# Patient Record
Sex: MALE | Race: WHITE | NOT HISPANIC OR LATINO | URBAN - METROPOLITAN AREA
[De-identification: names, ages, dates, MRNs, and addresses within clinical notes are randomized per-mention and may not be internally consistent; named-entity substitution may affect disease eponyms.]

---

## 2017-03-18 ENCOUNTER — EMERGENCY (EMERGENCY)
Facility: HOSPITAL | Age: 52
LOS: 1 days | Discharge: HOME | End: 2017-03-18
Attending: EMERGENCY MEDICINE | Admitting: EMERGENCY MEDICINE
Payer: COMMERCIAL

## 2017-03-18 VITALS
SYSTOLIC BLOOD PRESSURE: 151 MMHG | HEART RATE: 62 BPM | HEIGHT: 67 IN | OXYGEN SATURATION: 98 % | TEMPERATURE: 98 F | RESPIRATION RATE: 17 BRPM | WEIGHT: 205.03 LBS | DIASTOLIC BLOOD PRESSURE: 98 MMHG

## 2017-03-18 DIAGNOSIS — M25.532 PAIN IN LEFT WRIST: ICD-10-CM

## 2017-03-18 DIAGNOSIS — L03.114 CELLULITIS OF LEFT UPPER LIMB: ICD-10-CM

## 2017-03-18 DIAGNOSIS — Z98.890 OTHER SPECIFIED POSTPROCEDURAL STATES: ICD-10-CM

## 2017-03-18 DIAGNOSIS — Z90.79 ACQUIRED ABSENCE OF OTHER GENITAL ORGAN(S): Chronic | ICD-10-CM

## 2017-03-18 PROCEDURE — 99284 EMERGENCY DEPT VISIT MOD MDM: CPT | Mod: 25

## 2017-03-18 NOTE — ED ADULT NURSE NOTE - OBJECTIVE STATEMENT
52y male pt arrived to ED c/o left wrist and hand pain with swelling. Pt states that he slept on his wrist/hand, woke up with symptoms this morning. Denies bug bites, No numbness or tingling, +sensation, +strong peripheral pulse, tender to touch, took Aleve in the morning with some relief.

## 2017-03-19 VITALS
HEART RATE: 69 BPM | OXYGEN SATURATION: 96 % | DIASTOLIC BLOOD PRESSURE: 90 MMHG | TEMPERATURE: 98 F | RESPIRATION RATE: 18 BRPM | SYSTOLIC BLOOD PRESSURE: 144 MMHG

## 2017-03-19 LAB
ALBUMIN SERPL ELPH-MCNC: 4.3 G/DL — SIGNIFICANT CHANGE UP (ref 3.3–5)
ALP SERPL-CCNC: 52 U/L — SIGNIFICANT CHANGE UP (ref 40–120)
ALT FLD-CCNC: 23 U/L RC — SIGNIFICANT CHANGE UP (ref 10–45)
ANION GAP SERPL CALC-SCNC: 14 MMOL/L — SIGNIFICANT CHANGE UP (ref 5–17)
AST SERPL-CCNC: 21 U/L — SIGNIFICANT CHANGE UP (ref 10–40)
BASE EXCESS BLDV CALC-SCNC: 2.5 MMOL/L — HIGH (ref -2–2)
BASOPHILS # BLD AUTO: 0 K/UL — SIGNIFICANT CHANGE UP (ref 0–0.2)
BASOPHILS NFR BLD AUTO: 0.3 % — SIGNIFICANT CHANGE UP (ref 0–2)
BILIRUB SERPL-MCNC: 0.7 MG/DL — SIGNIFICANT CHANGE UP (ref 0.2–1.2)
BUN SERPL-MCNC: 19 MG/DL — SIGNIFICANT CHANGE UP (ref 7–23)
CA-I SERPL-SCNC: 1.2 MMOL/L — SIGNIFICANT CHANGE UP (ref 1.12–1.3)
CALCIUM SERPL-MCNC: 9.3 MG/DL — SIGNIFICANT CHANGE UP (ref 8.4–10.5)
CHLORIDE BLDV-SCNC: 109 MMOL/L — HIGH (ref 96–108)
CHLORIDE SERPL-SCNC: 102 MMOL/L — SIGNIFICANT CHANGE UP (ref 96–108)
CO2 BLDV-SCNC: 30 MMOL/L — SIGNIFICANT CHANGE UP (ref 22–30)
CO2 SERPL-SCNC: 24 MMOL/L — SIGNIFICANT CHANGE UP (ref 22–31)
CREAT SERPL-MCNC: 1.13 MG/DL — SIGNIFICANT CHANGE UP (ref 0.5–1.3)
EOSINOPHIL # BLD AUTO: 0.4 K/UL — SIGNIFICANT CHANGE UP (ref 0–0.5)
EOSINOPHIL NFR BLD AUTO: 3 % — SIGNIFICANT CHANGE UP (ref 0–6)
ERYTHROCYTE [SEDIMENTATION RATE] IN BLOOD: 2 MM/HR — SIGNIFICANT CHANGE UP (ref 0–20)
GAS PNL BLDV: 134 MMOL/L — LOW (ref 136–145)
GAS PNL BLDV: SIGNIFICANT CHANGE UP
GAS PNL BLDV: SIGNIFICANT CHANGE UP
GLUCOSE BLDV-MCNC: 94 MG/DL — SIGNIFICANT CHANGE UP (ref 70–99)
GLUCOSE SERPL-MCNC: 102 MG/DL — HIGH (ref 70–99)
HCO3 BLDV-SCNC: 28 MMOL/L — SIGNIFICANT CHANGE UP (ref 21–29)
HCT VFR BLD CALC: 46.4 % — SIGNIFICANT CHANGE UP (ref 39–50)
HCT VFR BLDA CALC: 48 % — SIGNIFICANT CHANGE UP (ref 39–50)
HGB BLD CALC-MCNC: 15.8 G/DL — SIGNIFICANT CHANGE UP (ref 13–17)
HGB BLD-MCNC: 15.9 G/DL — SIGNIFICANT CHANGE UP (ref 13–17)
HOROWITZ INDEX BLDV+IHG-RTO: SIGNIFICANT CHANGE UP
LACTATE BLDV-MCNC: 0.9 MMOL/L — SIGNIFICANT CHANGE UP (ref 0.7–2)
LYMPHOCYTES # BLD AUTO: 16.4 % — SIGNIFICANT CHANGE UP (ref 13–44)
LYMPHOCYTES # BLD AUTO: 2 K/UL — SIGNIFICANT CHANGE UP (ref 1–3.3)
MCHC RBC-ENTMCNC: 31.7 PG — SIGNIFICANT CHANGE UP (ref 27–34)
MCHC RBC-ENTMCNC: 34.3 GM/DL — SIGNIFICANT CHANGE UP (ref 32–36)
MCV RBC AUTO: 92.6 FL — SIGNIFICANT CHANGE UP (ref 80–100)
MONOCYTES # BLD AUTO: 0.9 K/UL — SIGNIFICANT CHANGE UP (ref 0–0.9)
MONOCYTES NFR BLD AUTO: 7.5 % — SIGNIFICANT CHANGE UP (ref 2–14)
NEUTROPHILS # BLD AUTO: 8.7 K/UL — HIGH (ref 1.8–7.4)
NEUTROPHILS NFR BLD AUTO: 72.8 % — SIGNIFICANT CHANGE UP (ref 43–77)
OTHER CELLS CSF MANUAL: 12 ML/DL — LOW (ref 18–22)
PCO2 BLDV: 50 MMHG — SIGNIFICANT CHANGE UP (ref 35–50)
PH BLDV: 7.37 — SIGNIFICANT CHANGE UP (ref 7.35–7.45)
PLATELET # BLD AUTO: 305 K/UL — SIGNIFICANT CHANGE UP (ref 150–400)
PO2 BLDV: 32 MMHG — SIGNIFICANT CHANGE UP (ref 25–45)
POTASSIUM BLDV-SCNC: 4.1 MMOL/L — SIGNIFICANT CHANGE UP (ref 3.5–5)
POTASSIUM SERPL-MCNC: 4.3 MMOL/L — SIGNIFICANT CHANGE UP (ref 3.5–5.3)
POTASSIUM SERPL-SCNC: 4.3 MMOL/L — SIGNIFICANT CHANGE UP (ref 3.5–5.3)
PROT SERPL-MCNC: 7.1 G/DL — SIGNIFICANT CHANGE UP (ref 6–8.3)
RBC # BLD: 5.01 M/UL — SIGNIFICANT CHANGE UP (ref 4.2–5.8)
RBC # FLD: 11.2 % — SIGNIFICANT CHANGE UP (ref 10.3–14.5)
SAO2 % BLDV: 56 % — LOW (ref 67–88)
SODIUM SERPL-SCNC: 140 MMOL/L — SIGNIFICANT CHANGE UP (ref 135–145)
URATE SERPL-MCNC: 8 MG/DL — SIGNIFICANT CHANGE UP (ref 3.4–8.8)
WBC # BLD: 12 K/UL — HIGH (ref 3.8–10.5)
WBC # FLD AUTO: 12 K/UL — HIGH (ref 3.8–10.5)

## 2017-03-19 PROCEDURE — 80053 COMPREHEN METABOLIC PANEL: CPT

## 2017-03-19 PROCEDURE — 73130 X-RAY EXAM OF HAND: CPT | Mod: 26,LT

## 2017-03-19 PROCEDURE — 73110 X-RAY EXAM OF WRIST: CPT

## 2017-03-19 PROCEDURE — 84132 ASSAY OF SERUM POTASSIUM: CPT

## 2017-03-19 PROCEDURE — 83605 ASSAY OF LACTIC ACID: CPT

## 2017-03-19 PROCEDURE — 96375 TX/PRO/DX INJ NEW DRUG ADDON: CPT

## 2017-03-19 PROCEDURE — 73110 X-RAY EXAM OF WRIST: CPT | Mod: 26,LT

## 2017-03-19 PROCEDURE — 85014 HEMATOCRIT: CPT

## 2017-03-19 PROCEDURE — 82947 ASSAY GLUCOSE BLOOD QUANT: CPT

## 2017-03-19 PROCEDURE — 85652 RBC SED RATE AUTOMATED: CPT

## 2017-03-19 PROCEDURE — 85027 COMPLETE CBC AUTOMATED: CPT

## 2017-03-19 PROCEDURE — 73130 X-RAY EXAM OF HAND: CPT

## 2017-03-19 PROCEDURE — 82803 BLOOD GASES ANY COMBINATION: CPT

## 2017-03-19 PROCEDURE — 99284 EMERGENCY DEPT VISIT MOD MDM: CPT | Mod: 25

## 2017-03-19 PROCEDURE — 84550 ASSAY OF BLOOD/URIC ACID: CPT

## 2017-03-19 PROCEDURE — 96374 THER/PROPH/DIAG INJ IV PUSH: CPT

## 2017-03-19 PROCEDURE — 99236 HOSP IP/OBS SAME DATE HI 85: CPT

## 2017-03-19 PROCEDURE — 82435 ASSAY OF BLOOD CHLORIDE: CPT

## 2017-03-19 PROCEDURE — 82330 ASSAY OF CALCIUM: CPT

## 2017-03-19 PROCEDURE — G0378: CPT

## 2017-03-19 PROCEDURE — 84295 ASSAY OF SERUM SODIUM: CPT

## 2017-03-19 RX ORDER — SODIUM CHLORIDE 9 MG/ML
3 INJECTION INTRAMUSCULAR; INTRAVENOUS; SUBCUTANEOUS EVERY 8 HOURS
Qty: 0 | Refills: 0 | Status: DISCONTINUED | OUTPATIENT
Start: 2017-03-19 | End: 2017-03-22

## 2017-03-19 RX ORDER — SODIUM CHLORIDE 9 MG/ML
1000 INJECTION INTRAMUSCULAR; INTRAVENOUS; SUBCUTANEOUS ONCE
Qty: 0 | Refills: 0 | Status: COMPLETED | OUTPATIENT
Start: 2017-03-19 | End: 2017-03-19

## 2017-03-19 RX ORDER — IBUPROFEN 200 MG
1 TABLET ORAL
Qty: 40 | Refills: 0
Start: 2017-03-19 | End: 2017-03-29

## 2017-03-19 RX ORDER — KETOROLAC TROMETHAMINE 30 MG/ML
30 SYRINGE (ML) INJECTION ONCE
Qty: 0 | Refills: 0 | Status: DISCONTINUED | OUTPATIENT
Start: 2017-03-19 | End: 2017-03-19

## 2017-03-19 RX ORDER — IBUPROFEN 200 MG
600 TABLET ORAL ONCE
Qty: 0 | Refills: 0 | Status: COMPLETED | OUTPATIENT
Start: 2017-03-19 | End: 2017-03-19

## 2017-03-19 RX ADMIN — Medication 600 MILLIGRAM(S): at 00:35

## 2017-03-19 RX ADMIN — Medication 600 MILLIGRAM(S): at 02:13

## 2017-03-19 RX ADMIN — Medication 100 MILLIGRAM(S): at 09:47

## 2017-03-19 RX ADMIN — SODIUM CHLORIDE 3 MILLILITER(S): 9 INJECTION INTRAMUSCULAR; INTRAVENOUS; SUBCUTANEOUS at 05:05

## 2017-03-19 RX ADMIN — SODIUM CHLORIDE 1000 MILLILITER(S): 9 INJECTION INTRAMUSCULAR; INTRAVENOUS; SUBCUTANEOUS at 02:12

## 2017-03-19 RX ADMIN — Medication 100 MILLIGRAM(S): at 02:12

## 2017-03-19 RX ADMIN — Medication 30 MILLIGRAM(S): at 09:26

## 2017-03-19 NOTE — ED PROVIDER NOTE - OBJECTIVE STATEMENT
51 yo M with left wrist pain and swelling that he woke up with this morning without a hx of trauma. Pt says he thinks he slept on it. No Hx of bug bites. Denies hx of gout. Took alleve this morning with some relief. 51 yo M with left wrist pain and swelling that he woke up with this morning without a hx of trauma. Pt says he thinks he slept on it. No Hx of bug bites. Denies hx of gout. Took alleve this morning with some relief. No paresthesias, no weakness.

## 2017-03-19 NOTE — ED CDU PROVIDER NOTE - OBJECTIVE STATEMENT
51 yo M with left wrist pain and swelling that he woke up with this morning without a hx of trauma. Pt says he thinks he slept on it. No Hx of bug bites. Denies hx of gout. Took alleve this morning with some relief. No paresthesias, no weakness.

## 2017-03-19 NOTE — ED CDU PROVIDER NOTE - MEDICAL DECISION MAKING DETAILS
Shayla: 51 yo M with left wrist pain, swelling, redness and warmth that he woke up with this morning without a hx of trauma.  -motrin, xrays of L hand and wrist, reassess  -will give clindamycin, perform labs and observe in the CDU for LUE cellulitis

## 2017-03-19 NOTE — ED CDU PROVIDER NOTE - ATTENDING CONTRIBUTION TO CARE
I performed a history and physical exam of the patient and discussed their management with the advanced care provider. I reviewed the advanced care provider's note and agree with the documented findings and plan of care. My medical decision making and objective findings are found above.

## 2017-03-19 NOTE — ED ADULT NURSE REASSESSMENT NOTE - NS ED NURSE REASSESS COMMENT FT1
Pt resting comfortably on stretcher, VSS, no complaints.
pt resting in stretcher.  VSS.  Pt reports 5 out of 10 left hand pain.  JOSE ELIAS Bailey made aware.  pts right hand visibly swollen and red.  pt states that the redness has improved since yesterday.  Next dose of clinda due at 10am.  plan of care explained. Safety maintained. Will cont to monitor.
Received pt from [facility] , received pt alert and responsive, oriented x4. Pt transferred to CDU for observation for L hand cellulitis, + redness, swelling to L hand, pt to receive IV antibiotics as ordered next dose due at 10am. IV in place, patent and free of signs of infiltration, V/S stable, pt afebrile, pt denies pain at this time. Pt educated on unit and unit rules, instructed patient to notify RN of any needed assistance, Pt verbalizes understanding, Call bell placed within reach. Spouse at bedside. Safety maintained. Will continue to monitor.

## 2017-03-19 NOTE — ED PROVIDER NOTE - MEDICAL DECISION MAKING DETAILS
51 yo M with left wrist pain and swelling that he woke up with this morning without a hx of trauma.  -motrin, xrays of L hand and wrist, reassess 53 yo M with left wrist pain and swelling that he woke up with this morning without a hx of trauma.  -motrin, xrays of L hand and wrist, reassess  -will give clindamycin, perform labs and observe in the CDU for LUE cellulitis 51 yo M with left wrist pain, swelling, redness and warmth that he woke up with this morning without a hx of trauma.  -motrin, xrays of L hand and wrist, reassess  -will give clindamycin, perform labs and observe in the CDU for LUE cellulitis

## 2017-03-19 NOTE — ED CDU PROVIDER NOTE - PROGRESS NOTE DETAILS
CDU NOTE JOSE ELIAS DELEON: Pt resting comfortably, feeling well without complaint. NAD, VSS. Pt sleeping comfortably, NAD.  VSS.  BS ATTENDING MD Britta: Pt with L-hand redness and swellign felt to be cellulitis obsed overnight. Erythema and swellign and pain all improved per patient. Exam with mild erythema and edema of dorsum of L-hand and wrist with mild TTP. Mild pain moving thumb and wrist. No palpable effusions. Given clear improvement, likely a correct diagnosis. Pt placed in pre-lucero splint for comfort. No signs of systemic infection. If continued improvement on IV antibiotics pt is likely stable to be switched to PO antibiotics and f/u as outpatient. If symptoms improve pt will f/u with PCP. If they do not improve or worsen, he will f/u with hand.

## 2017-03-19 NOTE — ED CDU PROVIDER NOTE - DETAILS
53 y/o M p/w cellulitis on hand   - observation status and frequent re-evaluation  - IV antibiotics  - pain control prn  - plan d/w Dr. Mcguire

## 2017-03-19 NOTE — ED PROVIDER NOTE - NEUROLOGICAL, MLM
Alert and oriented, no focal deficits, +ttp and mild swelling of the distal radius and thenar eminence. Alert and oriented, no focal deficits, +ttp and mild swelling of the distal radius and thenar eminence with erythema, warmth

## 2017-03-19 NOTE — ED CDU PROVIDER NOTE - PLAN OF CARE
(1) You will need to follow-up with your PMD in 2-3 days for your cellulitis. A copy of your results were given with you to bring to your appt.  (2) Be sure to review attached discharge paperwork.  (3) Drink plenty of fluids to stay hydrated.  (4) Continue your home medications as directed ALONG WITH Clindamycin 450mg three times per day for 7 days.  (5) Use Tylenol (extra strength over-the-counter) or Motrin (600mg which is three 200mg over-the-counter tablets at once every 6hrs) for your fevers or pain.  (6) Use warm compresses to the affected area for 20min at a time several times/day for next few days. Be careful not to burn your skin.  (7) Return to ER for uncontrolled fever, severe pain, trouble keeping down fluids, spread of infection or any other concerns.

## 2017-08-22 ENCOUNTER — APPOINTMENT (OUTPATIENT)
Dept: PULMONOLOGY | Facility: CLINIC | Age: 52
End: 2017-08-22
Payer: COMMERCIAL

## 2017-08-22 VITALS
OXYGEN SATURATION: 97 % | HEART RATE: 93 BPM | BODY MASS INDEX: 32.58 KG/M2 | WEIGHT: 220 LBS | HEIGHT: 69 IN | DIASTOLIC BLOOD PRESSURE: 80 MMHG | SYSTOLIC BLOOD PRESSURE: 118 MMHG

## 2017-08-22 PROCEDURE — 99214 OFFICE O/P EST MOD 30 MIN: CPT

## 2017-08-22 RX ORDER — IBUPROFEN 600 MG/1
600 TABLET, FILM COATED ORAL
Qty: 40 | Refills: 0 | Status: DISCONTINUED | COMMUNITY
Start: 2017-03-19

## 2017-08-22 RX ORDER — CLINDAMYCIN HYDROCHLORIDE 150 MG/1
150 CAPSULE ORAL
Qty: 90 | Refills: 0 | Status: DISCONTINUED | COMMUNITY
Start: 2017-03-19

## 2017-09-27 ENCOUNTER — APPOINTMENT (OUTPATIENT)
Dept: PULMONOLOGY | Facility: CLINIC | Age: 52
End: 2017-09-27
Payer: COMMERCIAL

## 2017-09-27 PROCEDURE — G0008: CPT

## 2017-09-27 PROCEDURE — 90686 IIV4 VACC NO PRSV 0.5 ML IM: CPT

## 2018-02-20 ENCOUNTER — APPOINTMENT (OUTPATIENT)
Dept: PULMONOLOGY | Facility: CLINIC | Age: 53
End: 2018-02-20

## 2019-04-01 PROBLEM — J44.9 CHRONIC OBSTRUCTIVE PULMONARY DISEASE, UNSPECIFIED: Chronic | Status: ACTIVE | Noted: 2017-03-18

## 2019-04-01 PROBLEM — C61 MALIGNANT NEOPLASM OF PROSTATE: Chronic | Status: ACTIVE | Noted: 2017-03-18

## 2019-04-02 ENCOUNTER — APPOINTMENT (OUTPATIENT)
Dept: PULMONOLOGY | Facility: CLINIC | Age: 54
End: 2019-04-02
Payer: COMMERCIAL

## 2019-04-02 VITALS
HEART RATE: 92 BPM | SYSTOLIC BLOOD PRESSURE: 124 MMHG | DIASTOLIC BLOOD PRESSURE: 80 MMHG | WEIGHT: 229 LBS | HEIGHT: 69 IN | OXYGEN SATURATION: 94 % | BODY MASS INDEX: 33.92 KG/M2

## 2019-04-02 PROCEDURE — 94060 EVALUATION OF WHEEZING: CPT

## 2019-04-02 PROCEDURE — 94729 DIFFUSING CAPACITY: CPT

## 2019-04-02 PROCEDURE — 94727 GAS DIL/WSHOT DETER LNG VOL: CPT

## 2019-04-02 PROCEDURE — 99214 OFFICE O/P EST MOD 30 MIN: CPT | Mod: 25

## 2019-04-08 NOTE — DISCUSSION/SUMMARY
[FreeTextEntry1] : 53 yo male with stable OAD. He is to continue anoro daily with PRN albuterol MDI. Treatment adjustment will depend on symptomatic needs.

## 2019-04-08 NOTE — REVIEW OF SYSTEMS
[Cough] : no cough [Sputum] : not coughing up ~M sputum [Dyspnea] : no dyspnea [Negative] : Sleep Disorder

## 2019-04-08 NOTE — HISTORY OF PRESENT ILLNESS
[FreeTextEntry1] : 55 yo male with hx of COPD presents for follow up. The patient feels "good" on daily anoro with PRN albuterol MDI. He denies cough, chest pain, SOB or hemoptysis.

## 2019-07-03 ENCOUNTER — OTHER (OUTPATIENT)
Age: 54
End: 2019-07-03

## 2019-10-01 ENCOUNTER — APPOINTMENT (OUTPATIENT)
Dept: PULMONOLOGY | Facility: CLINIC | Age: 54
End: 2019-10-01

## 2019-11-19 ENCOUNTER — APPOINTMENT (OUTPATIENT)
Dept: PULMONOLOGY | Facility: CLINIC | Age: 54
End: 2019-11-19
Payer: COMMERCIAL

## 2019-11-19 VITALS
WEIGHT: 234 LBS | HEART RATE: 100 BPM | BODY MASS INDEX: 34.56 KG/M2 | TEMPERATURE: 99.5 F | OXYGEN SATURATION: 95 % | DIASTOLIC BLOOD PRESSURE: 80 MMHG | SYSTOLIC BLOOD PRESSURE: 124 MMHG

## 2019-11-19 PROCEDURE — 99214 OFFICE O/P EST MOD 30 MIN: CPT

## 2019-11-25 NOTE — DISCUSSION/SUMMARY
[FreeTextEntry1] : 53 yo male with OAD  with acute bronchitis. Medrol and zpack prescribed. He is to continue use of anoro daily with PRN albuterol MDI. Treatment adjustment will depend on symptomatic needs. He is to follow up with his PMD for the influenza vaccine.

## 2019-11-25 NOTE — REVIEW OF SYSTEMS
[Nasal Congestion] : nasal congestion [As Noted in HPI] : as noted in HPI [Sputum] : sputum  [Cough] : cough [Dyspnea] : dyspnea [Wheezing] : wheezing [Negative] : Sleep Disorder

## 2019-11-25 NOTE — PHYSICAL EXAM
[General Appearance - Well Developed] : well developed [Normal Appearance] : normal appearance [General Appearance - Well Nourished] : well nourished [No Deformities] : no deformities [Well Groomed] : well groomed [General Appearance - In No Acute Distress] : no acute distress [Normal Oropharynx] : normal oropharynx [Eyelids - No Xanthelasma] : the eyelids demonstrated no xanthelasmas [Normal Conjunctiva] : the conjunctiva exhibited no abnormalities [Neck Cervical Mass (___cm)] : no neck mass was observed [Neck Appearance] : the appearance of the neck was normal [Jugular Venous Distention Increased] : there was no jugular-venous distention [Thyroid Diffuse Enlargement] : the thyroid was not enlarged [Thyroid Nodule] : there were no palpable thyroid nodules [Heart Sounds] : normal S1 and S2 [Heart Rate And Rhythm] : heart rate and rhythm were normal [Exaggerated Use Of Accessory Muscles For Inspiration] : no accessory muscle use [Murmurs] : no murmurs present [Respiration, Rhythm And Depth] : normal respiratory rhythm and effort [Decreased Breath Sounds] : decreased breath sounds [Scattered Wheezes] : scattered wheezing [Abdomen Tenderness] : non-tender [Abdomen Soft] : soft [Abdomen Mass (___ Cm)] : no abdominal mass palpated [Nail Clubbing] : no clubbing of the fingernails [Petechial Hemorrhages (___cm)] : no petechial hemorrhages [Cyanosis, Localized] : no localized cyanosis [Skin Color & Pigmentation] : normal skin color and pigmentation [] : no rash [No Venous Stasis] : no venous stasis [No Skin Ulcers] : no skin ulcer [Skin Lesions] : no skin lesions [No Xanthoma] : no  xanthoma was observed [No Focal Deficits] : no focal deficits [Impaired Insight] : insight and judgment were intact [Oriented To Time, Place, And Person] : oriented to person, place, and time [Affect] : the affect was normal

## 2019-11-25 NOTE — CONSULT LETTER
[Dear  ___] : Dear  [unfilled], [Courtesy Letter:] : I had the pleasure of seeing your patient, [unfilled], in my office today. [Sincerely,] : Sincerely, [Consult Closing:] : Thank you very much for allowing me to participate in the care of this patient.  If you have any questions, please do not hesitate to contact me. [Please see my note below.] : Please see my note below.

## 2019-11-25 NOTE — HISTORY OF PRESENT ILLNESS
[FreeTextEntry1] : 55 yo male with hx of OAD presents for follow up. The patient complains of a 2 week history of productive cough with SOB . He has increased use of albuterol MDI and continues to use anoro daily. He was treated with claritin and expectorant by his PMD with some improvement. He denies fever, chest pain or hemoptysis.

## 2021-02-23 ENCOUNTER — APPOINTMENT (OUTPATIENT)
Dept: PULMONOLOGY | Facility: CLINIC | Age: 56
End: 2021-02-23
Payer: COMMERCIAL

## 2021-02-23 VITALS
TEMPERATURE: 98 F | HEART RATE: 85 BPM | DIASTOLIC BLOOD PRESSURE: 98 MMHG | RESPIRATION RATE: 16 BRPM | WEIGHT: 224 LBS | SYSTOLIC BLOOD PRESSURE: 141 MMHG | OXYGEN SATURATION: 94 % | BODY MASS INDEX: 33.08 KG/M2

## 2021-02-23 DIAGNOSIS — I10 ESSENTIAL (PRIMARY) HYPERTENSION: ICD-10-CM

## 2021-02-23 PROCEDURE — 99213 OFFICE O/P EST LOW 20 MIN: CPT

## 2021-02-23 PROCEDURE — 99072 ADDL SUPL MATRL&STAF TM PHE: CPT

## 2021-03-07 PROBLEM — I10 HTN (HYPERTENSION): Status: ACTIVE | Noted: 2021-03-07

## 2021-03-07 NOTE — DISCUSSION/SUMMARY
[FreeTextEntry1] : 55 yo male with OAD at baseline. He is to continue anoro as before with PRN albuterol MDI. Treatment adjustment will depend on symptomatic needs. Follow up with for joint pains and his PMD for BP control.

## 2021-03-07 NOTE — HISTORY OF PRESENT ILLNESS
[TextBox_4] : 57 yo male with hx of OAD presents for follow up. The patient is "OK" on daily anoro with PRN albuterol MDI use. He complains of PRN productive cough. He was recently treated for "gout" complaining of joint pain,presently being evaluated by rheumatology. He is being treated with allopurinol with some improvement.

## 2021-08-24 ENCOUNTER — APPOINTMENT (OUTPATIENT)
Dept: PULMONOLOGY | Facility: CLINIC | Age: 56
End: 2021-08-24

## 2021-12-29 ENCOUNTER — APPOINTMENT (OUTPATIENT)
Dept: PULMONOLOGY | Facility: CLINIC | Age: 56
End: 2021-12-29
Payer: COMMERCIAL

## 2021-12-29 VITALS
HEIGHT: 69 IN | TEMPERATURE: 98.7 F | HEART RATE: 92 BPM | WEIGHT: 212 LBS | OXYGEN SATURATION: 94 % | BODY MASS INDEX: 31.4 KG/M2 | DIASTOLIC BLOOD PRESSURE: 92 MMHG | SYSTOLIC BLOOD PRESSURE: 144 MMHG

## 2021-12-29 DIAGNOSIS — J20.9 ACUTE BRONCHITIS, UNSPECIFIED: ICD-10-CM

## 2021-12-29 PROCEDURE — 99213 OFFICE O/P EST LOW 20 MIN: CPT

## 2022-01-16 PROBLEM — J20.9 ACUTE BRONCHITIS WITH OBSTRUCTION: Status: RESOLVED | Noted: 2022-01-16 | Resolved: 2022-02-15

## 2022-01-16 NOTE — HISTORY OF PRESENT ILLNESS
[Former] : former [< 30 pack-years] : < 30 pack-years [TextBox_4] : 57 yo male with hx of OAD presents complaining of two week history of increased SOB with PRN cough and wheezing. He denies fever, chest pain or hemoptysis. He uses anoro daily with recent increase in albuterol MDI. He is covid 19 vaccinated and had booster after which he claims his symptoms have increased. [TextBox_29] : Denies snoring, daytime somnolence, apneic episodes, AM headaches

## 2022-01-16 NOTE — DISCUSSION/SUMMARY
[FreeTextEntry1] : 55 yo male with OAD exacerbation. Prednisone 40 mg daily for five days prescribed. He was given a one week sample of breztri after which he is to resume anoro daily.Continued PRN albuterol MDI use.

## 2022-01-16 NOTE — REVIEW OF SYSTEMS
[Cough] : cough [Dyspnea] : dyspnea [Wheezing] : wheezing [Arthralgias] : arthralgias [Negative] : Endocrine

## 2022-01-31 RX ORDER — UMECLIDINIUM BROMIDE AND VILANTEROL TRIFENATATE 62.5; 25 UG/1; UG/1
62.5-25 POWDER RESPIRATORY (INHALATION) DAILY
Qty: 1 | Refills: 3 | Status: ACTIVE | COMMUNITY
Start: 2022-01-31 | End: 1900-01-01

## 2022-11-15 ENCOUNTER — APPOINTMENT (OUTPATIENT)
Dept: PULMONOLOGY | Facility: CLINIC | Age: 57
End: 2022-11-15

## 2022-11-15 VITALS
DIASTOLIC BLOOD PRESSURE: 82 MMHG | OXYGEN SATURATION: 95 % | RESPIRATION RATE: 20 BRPM | SYSTOLIC BLOOD PRESSURE: 126 MMHG | HEART RATE: 92 BPM | TEMPERATURE: 97 F

## 2022-11-15 DIAGNOSIS — J21.9 ACUTE BRONCHIOLITIS, UNSPECIFIED: ICD-10-CM

## 2022-11-15 PROCEDURE — 94727 GAS DIL/WSHOT DETER LNG VOL: CPT

## 2022-11-15 PROCEDURE — 94060 EVALUATION OF WHEEZING: CPT

## 2022-11-15 PROCEDURE — 94729 DIFFUSING CAPACITY: CPT

## 2022-11-15 PROCEDURE — 99214 OFFICE O/P EST MOD 30 MIN: CPT | Mod: 25

## 2022-11-15 NOTE — HISTORY OF PRESENT ILLNESS
[Former] : former [TextBox_4] : 56 yo male with hx of OAD presents for follow up. The patient complains of few week history of increasing MURRAY with mild productive cough. He denies fever, chest pain or hemoptysis. He uses anoro daily with PRN xopenex.  [TextBox_11] : 1 1/2 [TextBox_13] : 30 [YearQuit] : 2010 [TextBox_29] : Denies snoring, daytime somnolence, apneic episodes, AM headaches

## 2022-11-15 NOTE — PHYSICAL EXAM
[No Acute Distress] : no acute distress [Normal Oropharynx] : normal oropharynx [Normal Appearance] : normal appearance [No Neck Mass] : no neck mass [Normal Rate/Rhythm] : normal rate/rhythm [Normal S1, S2] : normal s1, s2 [No Murmurs] : no murmurs [No Resp Distress] : no resp distress [Clear to Auscultation Bilaterally] : clear to auscultation bilaterally [No Abnormalities] : no abnormalities [Benign] : benign [Normal Gait] : normal gait [No Clubbing] : no clubbing [No Cyanosis] : no cyanosis [No Edema] : no edema [FROM] : FROM [Normal Color/ Pigmentation] : normal color/ pigmentation [No Focal Deficits] : no focal deficits [Oriented x3] : oriented x3 [Normal Affect] : normal affect [TextBox_68] : Decreased breath sounds bilaterally

## 2022-11-15 NOTE — REVIEW OF SYSTEMS
[Cough] : cough [Sputum] : sputum [Wheezing] : wheezing [SOB on Exertion] : sob on exertion [Arthralgias] : arthralgias [Negative] : Endocrine

## 2023-04-12 ENCOUNTER — APPOINTMENT (OUTPATIENT)
Dept: OPHTHALMOLOGY | Facility: CLINIC | Age: 58
End: 2023-04-12

## 2023-05-05 NOTE — DISCUSSION/SUMMARY
[FreeTextEntry1] : 58 yo male with hx of OAD with recent exacerbation. Prednisone 40 mg daily for five days prescribed. He was given a one month sample of trelegy 100 to use in place of anoro. Treatment adjustment will depend on symptomatic needs.I reviewed the PFT results. A low dose screening chest CT will be performed.
bilateral UE Passive ROM was WFL  (within functional limits)

## 2023-05-09 ENCOUNTER — APPOINTMENT (OUTPATIENT)
Dept: PULMONOLOGY | Facility: CLINIC | Age: 58
End: 2023-05-09
Payer: COMMERCIAL

## 2023-05-09 VITALS
OXYGEN SATURATION: 96 % | TEMPERATURE: 98.4 F | WEIGHT: 219 LBS | SYSTOLIC BLOOD PRESSURE: 116 MMHG | HEART RATE: 86 BPM | BODY MASS INDEX: 32.34 KG/M2 | DIASTOLIC BLOOD PRESSURE: 80 MMHG

## 2023-05-09 DIAGNOSIS — R05.9 COUGH, UNSPECIFIED: ICD-10-CM

## 2023-05-09 DIAGNOSIS — J44.9 CHRONIC OBSTRUCTIVE PULMONARY DISEASE, UNSPECIFIED: ICD-10-CM

## 2023-05-09 DIAGNOSIS — R06.00 DYSPNEA, UNSPECIFIED: ICD-10-CM

## 2023-05-09 PROCEDURE — 99214 OFFICE O/P EST MOD 30 MIN: CPT

## 2023-06-29 RX ORDER — FLUTICASONE FUROATE, UMECLIDINIUM BROMIDE AND VILANTEROL TRIFENATATE 100; 62.5; 25 UG/1; UG/1; UG/1
100-62.5-25 POWDER RESPIRATORY (INHALATION)
Qty: 1 | Refills: 3 | Status: ACTIVE | COMMUNITY
Start: 2023-06-29 | End: 1900-01-01

## 2023-07-14 ENCOUNTER — APPOINTMENT (OUTPATIENT)
Dept: PULMONOLOGY | Facility: CLINIC | Age: 58
End: 2023-07-14
Payer: COMMERCIAL

## 2023-07-14 VITALS
SYSTOLIC BLOOD PRESSURE: 120 MMHG | HEART RATE: 104 BPM | OXYGEN SATURATION: 93 % | TEMPERATURE: 98.9 F | DIASTOLIC BLOOD PRESSURE: 80 MMHG | BODY MASS INDEX: 32.49 KG/M2 | WEIGHT: 220 LBS

## 2023-07-14 PROCEDURE — 99214 OFFICE O/P EST MOD 30 MIN: CPT

## 2023-07-14 RX ORDER — MONTELUKAST 10 MG/1
10 TABLET, FILM COATED ORAL
Qty: 90 | Refills: 1 | Status: ACTIVE | COMMUNITY
Start: 2023-07-14 | End: 1900-01-01

## 2023-07-25 NOTE — HISTORY OF PRESENT ILLNESS
[Former] : former [>= 20 pack years] : >= 20 pack years [TextBox_4] : 58-year-old male with history of OAD presents for follow-up.  The patient complains of few week history of productive cough with nasal congestion associated with shortness of breath.  He has been using Trelegy for the last 2 weeks in place of Anoro and has increased use of L-albuterol.  He denies fever chills chest pain or hemoptysis. [TextBox_11] : 1 1/2 [TextBox_13] : 30 [YearQuit] : 2010 [TextBox_29] : Denies snoring, daytime somnolence, apneic episodes, AM headaches

## 2023-07-25 NOTE — PHYSICAL EXAM
[No Acute Distress] : no acute distress [Normal Oropharynx] : normal oropharynx [Normal Appearance] : normal appearance [No Neck Mass] : no neck mass [Normal Rate/Rhythm] : normal rate/rhythm [No Murmurs] : no murmurs [Normal S1, S2] : normal s1, s2 [No Resp Distress] : no resp distress [Clear to Auscultation Bilaterally] : clear to auscultation bilaterally [No Abnormalities] : no abnormalities [Benign] : benign [Normal Gait] : normal gait [No Clubbing] : no clubbing [No Cyanosis] : no cyanosis [No Edema] : no edema [FROM] : FROM [Normal Color/ Pigmentation] : normal color/ pigmentation [No Focal Deficits] : no focal deficits [Oriented x3] : oriented x3 [Normal Affect] : normal affect [TextBox_68] : Decreased breath sounds bilaterally

## 2023-07-25 NOTE — DISCUSSION/SUMMARY
[FreeTextEntry1] : 58-year-old male with history of OAD and related complaints.  He was given a 2-week sample of Trelegy 100.  He is to continue Anoro daily as well as albuterol as needed.  A follow-up chest CT will be performed.

## 2023-07-25 NOTE — REVIEW OF SYSTEMS
[Nasal Congestion] : nasal congestion [Cough] : cough [Sputum] : sputum [Wheezing] : wheezing [SOB on Exertion] : sob on exertion [Arthralgias] : arthralgias [Negative] : Endocrine

## 2023-07-26 NOTE — HISTORY OF PRESENT ILLNESS
[Former] : former [>= 20 pack years] : >= 20 pack years [TextBox_4] : 58-year-old male with history of OAD presents for follow-up.  Patient continues to complain of productive cough with nasal congestion.  He denies fever, chest pain, or hemoptysis. [TextBox_11] : 1 1/2 [TextBox_13] : 30 [YearQuit] : 2010 [TextBox_29] : Denies snoring, daytime somnolence, apneic episodes, AM headaches

## 2023-07-26 NOTE — DISCUSSION/SUMMARY
[FreeTextEntry1] : 58-year-old male with history of OAD and related complaints.  He is to continue use of Anoro as before as well as albuterol.  Treatment just will depend on symptomatic needs.

## 2023-07-27 PROBLEM — Z78.9 NO SIGNIFICANT FAMILY HISTORY: Status: ACTIVE | Noted: 2023-07-27

## 2023-07-27 RX ORDER — PREDNISONE 20 MG/1
20 TABLET ORAL
Qty: 10 | Refills: 0 | Status: DISCONTINUED | COMMUNITY
Start: 2023-07-14 | End: 2023-07-27

## 2023-07-27 RX ORDER — PREDNISONE 20 MG/1
20 TABLET ORAL
Qty: 10 | Refills: 0 | Status: DISCONTINUED | COMMUNITY
Start: 2021-12-29 | End: 2023-07-27

## 2023-07-28 ENCOUNTER — APPOINTMENT (OUTPATIENT)
Dept: SURGERY | Facility: CLINIC | Age: 58
End: 2023-07-28
Payer: COMMERCIAL

## 2023-07-28 VITALS
HEIGHT: 68 IN | DIASTOLIC BLOOD PRESSURE: 79 MMHG | RESPIRATION RATE: 16 BRPM | SYSTOLIC BLOOD PRESSURE: 111 MMHG | BODY MASS INDEX: 32.58 KG/M2 | TEMPERATURE: 94.3 F | WEIGHT: 215 LBS | HEART RATE: 82 BPM | OXYGEN SATURATION: 96 %

## 2023-07-28 DIAGNOSIS — K42.9 UMBILICAL HERNIA W/OUT OBSTRUCTION OR GANGRENE: ICD-10-CM

## 2023-07-28 DIAGNOSIS — Z78.9 OTHER SPECIFIED HEALTH STATUS: ICD-10-CM

## 2023-07-28 PROCEDURE — 99203 OFFICE O/P NEW LOW 30 MIN: CPT

## 2023-07-28 RX ORDER — LEVALBUTEROL TARTRATE 45 UG/1
45 AEROSOL, METERED ORAL TWICE DAILY
Qty: 3 | Refills: 1 | Status: DISCONTINUED | COMMUNITY
Start: 2018-01-30 | End: 2023-07-28

## 2023-07-28 RX ORDER — UMECLIDINIUM BROMIDE AND VILANTEROL TRIFENATATE 62.5; 25 UG/1; UG/1
62.5-25 POWDER RESPIRATORY (INHALATION) DAILY
Qty: 3 | Refills: 3 | Status: DISCONTINUED | COMMUNITY
Start: 2019-01-14 | End: 2023-07-28

## 2023-07-28 RX ORDER — LEVALBUTEROL TARTRATE 45 UG/1
45 AEROSOL, METERED ORAL TWICE DAILY
Qty: 3 | Refills: 3 | Status: DISCONTINUED | COMMUNITY
Start: 2019-07-03 | End: 2023-07-28

## 2023-07-28 RX ORDER — AMLODIPINE BESYLATE 5 MG/1
5 TABLET ORAL
Refills: 0 | Status: ACTIVE | COMMUNITY

## 2023-07-28 RX ORDER — UMECLIDINIUM BROMIDE AND VILANTEROL TRIFENATATE 62.5; 25 UG/1; UG/1
62.5-25 POWDER RESPIRATORY (INHALATION) DAILY
Qty: 3 | Refills: 1 | Status: DISCONTINUED | COMMUNITY
Start: 2020-03-31 | End: 2023-07-28

## 2023-07-28 RX ORDER — ALLOPURINOL 100 MG/1
100 TABLET ORAL
Refills: 0 | Status: ACTIVE | COMMUNITY

## 2023-07-28 RX ORDER — LEVALBUTEROL TARTRATE 45 UG/1
AEROSOL, METERED ORAL
Refills: 0 | Status: ACTIVE | COMMUNITY

## 2023-07-28 RX ORDER — LEVALBUTEROL TARTRATE 45 UG/1
45 AEROSOL, METERED ORAL TWICE DAILY
Qty: 3 | Refills: 3 | Status: DISCONTINUED | COMMUNITY
Start: 2023-07-14 | End: 2023-07-28

## 2023-07-28 RX ORDER — AZITHROMYCIN 250 MG/1
250 TABLET, FILM COATED ORAL
Qty: 1 | Refills: 3 | Status: DISCONTINUED | COMMUNITY
Start: 2019-11-19 | End: 2023-07-28

## 2023-07-28 RX ORDER — METHYLPREDNISOLONE 4 MG/1
4 TABLET ORAL
Qty: 1 | Refills: 0 | Status: DISCONTINUED | COMMUNITY
Start: 2019-11-19 | End: 2023-07-28

## 2023-07-28 RX ORDER — AZITHROMYCIN 250 MG/1
250 TABLET, FILM COATED ORAL
Qty: 1 | Refills: 0 | Status: DISCONTINUED | COMMUNITY
Start: 2023-07-14 | End: 2023-07-28

## 2023-07-28 NOTE — ASSESSMENT
[FreeTextEntry1] : In summary the patient has a small asymptomatic nontender easily reducible fat-containing recurrent incisional hernia at his umbilicus.  This was initially repaired with mesh at the time of his robotic prostatectomy 12 years ago.  It has not appreciably increasing in size and since it is asymptomatic and completely reducible I recommended conservative treatment.  I will see him in 1 year for a checkup and instructed him to call me if he notices that it is increasing in size or bothering him in any way.  I also encouraged him to try to lose weight as any elective hernia repair will be at increased risk of recurrence due to his obesity.

## 2023-07-28 NOTE — PHYSICAL EXAM
[Normal Breath Sounds] : Normal breath sounds [Normal Heart Sounds] : normal heart sounds [No Rash or Lesion] : No rash or lesion [Alert] : alert [Oriented to Person] : oriented to person [Oriented to Place] : oriented to place [Oriented to Time] : oriented to time [Calm] : calm [de-identified] : WNL [de-identified] : WNL [de-identified] : VIVIANL [de-identified] : Small nontender easily reducible fat-containing recurrent incisional hernia at the umbilicus.  There is a left paramedian incision without evidence of incisional hernia.  No palpable inguinal hernias bilaterally [de-identified] : WNL ROM [de-identified] : WNL

## 2023-08-17 RX ORDER — FLUTICASONE FUROATE, UMECLIDINIUM BROMIDE AND VILANTEROL TRIFENATATE 100; 62.5; 25 UG/1; UG/1; UG/1
100-62.5-25 POWDER RESPIRATORY (INHALATION)
Qty: 3 | Refills: 3 | Status: ACTIVE | COMMUNITY
Start: 2023-08-17 | End: 1900-01-01

## 2023-08-22 NOTE — ED ADULT NURSE NOTE - FINAL NURSING ELECTRONIC SIGNATURE
Patient: Duke Freire    Procedure Summary       Date: 08/22/23 Room / Location: [facility] OR 11 / [facility] MAIN OR    Anesthesia Start: 0753 Anesthesia Stop: 1005    Procedure: TOTAL HIP ARTHROPLASTY ANTERIOR WITH HANA TABLE (Right: Hip) Diagnosis:       Primary osteoarthritis of right hip      (Primary osteoarthritis of right hip [M16.11])    Surgeons: Wilber Mccullough MD Provider: Johnny Best MD    Anesthesia Type: general ASA Status: 3            Anesthesia Type: general    Vitals  Vitals Value Taken Time   /79 08/22/23 1249   Temp 97.6 øF (36.4 øC) 08/22/23 1245   Pulse 84 08/22/23 1250   Resp 9 08/22/23 1245   SpO2 98 % 08/22/23 1250   Vitals shown include unvalidated device data.        Post Anesthesia Care and Evaluation    Patient location during evaluation: PACU  Patient participation: complete - patient participated  Level of consciousness: awake  Pain scale: See nurse's notes for pain score.  Pain management: adequate    Airway patency: patent  Anesthetic complications: No anesthetic complications  PONV Status: none  Cardiovascular status: acceptable  Respiratory status: acceptable and spontaneous ventilation  Hydration status: acceptable    Comments: Patient seen and examined postoperatively; vital signs stable; SpO2 greater than or equal to 90%; cardiopulmonary status stable; nausea/vomiting adequately controlled; pain adequately controlled; no apparent anesthesia complications; patient discharged from anesthesia care when discharge criteria were met     19-Mar-2017 11:22

## 2023-10-13 ENCOUNTER — APPOINTMENT (OUTPATIENT)
Dept: PULMONOLOGY | Facility: CLINIC | Age: 58
End: 2023-10-13
Payer: COMMERCIAL

## 2023-10-13 VITALS
OXYGEN SATURATION: 95 % | SYSTOLIC BLOOD PRESSURE: 120 MMHG | TEMPERATURE: 98 F | HEART RATE: 93 BPM | BODY MASS INDEX: 33.3 KG/M2 | DIASTOLIC BLOOD PRESSURE: 80 MMHG | WEIGHT: 219 LBS

## 2023-10-13 DIAGNOSIS — Z23 ENCOUNTER FOR IMMUNIZATION: ICD-10-CM

## 2023-10-13 PROCEDURE — 99214 OFFICE O/P EST MOD 30 MIN: CPT | Mod: 25

## 2023-10-13 PROCEDURE — 90686 IIV4 VACC NO PRSV 0.5 ML IM: CPT

## 2023-10-13 PROCEDURE — G0008: CPT

## 2023-10-14 PROBLEM — Z23 ENCOUNTER FOR IMMUNIZATION: Status: ACTIVE | Noted: 2023-10-14 | Resolved: 2023-10-28

## 2023-10-18 ENCOUNTER — MED ADMIN CHARGE (OUTPATIENT)
Age: 58
End: 2023-10-18

## 2023-11-07 ENCOUNTER — APPOINTMENT (OUTPATIENT)
Dept: PULMONOLOGY | Facility: CLINIC | Age: 58
End: 2023-11-07

## 2024-01-05 RX ORDER — MONTELUKAST 10 MG/1
10 TABLET, FILM COATED ORAL
Qty: 90 | Refills: 3 | Status: ACTIVE | COMMUNITY
Start: 2024-01-05 | End: 1900-01-01

## 2024-01-12 ENCOUNTER — NON-APPOINTMENT (OUTPATIENT)
Age: 59
End: 2024-01-12

## 2024-01-12 ENCOUNTER — APPOINTMENT (OUTPATIENT)
Dept: SURGERY | Facility: CLINIC | Age: 59
End: 2024-01-12
Payer: COMMERCIAL

## 2024-01-12 DIAGNOSIS — K43.2 INCISIONAL HERNIA W/OUT OBSTRUCTION OR GANGRENE: ICD-10-CM

## 2024-01-12 PROCEDURE — 99213 OFFICE O/P EST LOW 20 MIN: CPT

## 2024-01-12 NOTE — ASSESSMENT
[FreeTextEntry1] : In summary the patient has a small fat-containing reducible mildly symptomatic recurrent incisional hernia just to the right of his umbilicus.  He is status post robotic prostatectomy with concurrent incisional hernia repair with mesh at the time of his surgery.  His hernia is causing him mild discomfort.  I ordered a CT abdomen pelvis.  Final decision regarding surgery versus continued observation will be made after his CT.  He is presently overweight and I explained that the heavier he is the greater the risk of hernia recurrence.  I encouraged him to make a effort to lose weight.

## 2024-01-12 NOTE — HISTORY OF PRESENT ILLNESS
[de-identified] : Faisal is a 58 year old male here for a follow up visit.   Last seen on 7/28/23 - In summary the patient has a small asymptomatic nontender easily reducible fat-containing recurrent incisional hernia at his umbilicus. This was initially repaired with mesh at the time of his robotic prostatectomy 12 years ago. It has not appreciably increasing in size and since it is asymptomatic and completely reducible I recommended conservative treatment. I will see him in 1 year for a checkup and instructed him to call me if he notices that it is increasing in size or bothering him in any way. I also encouraged him to try to lose weight as any elective hernia repair will be at increased risk of recurrence due to his obesity.  Today patient reports having the bulge in his umbilicus getting bigger and gets twinges from certain positions.  Regular BMs once daily.  No voiding issues.   No nausea or vomiting.  Denies fever or chills.  Not on any ACG.

## 2024-01-12 NOTE — PHYSICAL EXAM
[Normal Breath Sounds] : Normal breath sounds [Normal Rate and Rhythm] : normal rate and rhythm [No Rash or Lesion] : No rash or lesion [Alert] : alert [Calm] : calm [de-identified] : nad [de-identified] : Obese, soft, mildly tender reducible small fat-containing recurrent incisional hernia just to the right of the umbilicus.  Moderate diastases recti. [de-identified] : nl

## 2024-01-16 ENCOUNTER — RESULT REVIEW (OUTPATIENT)
Age: 59
End: 2024-01-16

## 2024-02-22 RX ORDER — UMECLIDINIUM BROMIDE AND VILANTEROL TRIFENATATE 62.5; 25 UG/1; UG/1
62.5-25 POWDER RESPIRATORY (INHALATION)
Qty: 3 | Refills: 3 | Status: ACTIVE | COMMUNITY
Start: 2024-02-22 | End: 1900-01-01

## 2024-04-16 ENCOUNTER — APPOINTMENT (OUTPATIENT)
Dept: PULMONOLOGY | Facility: CLINIC | Age: 59
End: 2024-04-16
Payer: COMMERCIAL

## 2024-04-16 VITALS
HEART RATE: 96 BPM | OXYGEN SATURATION: 95 % | TEMPERATURE: 97.8 F | SYSTOLIC BLOOD PRESSURE: 128 MMHG | DIASTOLIC BLOOD PRESSURE: 80 MMHG | BODY MASS INDEX: 34.52 KG/M2 | WEIGHT: 227 LBS

## 2024-04-16 PROCEDURE — 99214 OFFICE O/P EST MOD 30 MIN: CPT

## 2024-04-16 RX ORDER — PREDNISONE 20 MG/1
20 TABLET ORAL
Qty: 10 | Refills: 0 | Status: ACTIVE | COMMUNITY
Start: 2024-04-16 | End: 1900-01-01

## 2024-04-16 NOTE — HISTORY OF PRESENT ILLNESS
[Former] : former [>= 20 pack years] : >= 20 pack years [TextBox_4] : 59-year-old male with history of OAD presents for follow-up.  Patient complains of 10-day history of increasing cough and shortness of breath without fever, chills, chest pain, hemoptysis, night sweats or weight loss.  He continues to use anoro daily with montelukast and recently increased use of albuterol MDI. [TextBox_11] : 1 1/2 [TextBox_13] : 30 [YearQuit] : 2010 [TextBox_29] : Denies snoring, daytime somnolence, apneic episodes, AM headaches

## 2024-04-16 NOTE — REVIEW OF SYSTEMS
[Nasal Congestion] : no nasal congestion [Cough] : cough [Sputum] : sputum [Dyspnea] : dyspnea [Wheezing] : wheezing [SOB on Exertion] : sob on exertion [Arthralgias] : arthralgias [Negative] : Endocrine

## 2024-04-16 NOTE — DISCUSSION/SUMMARY
[FreeTextEntry1] : 59-year-old male with history of OAD with exacerbation.  Prednisone 40 mg daily for 5 days was prescribed.  He was given a 2-week sample of Breztri in place of Anoro and is to continue use of montelukast with albuterol as needed.  Treatment adjust will depend on symptomatic needs.  PFTs will be repeated in the future.

## 2024-05-08 ENCOUNTER — OUTPATIENT (OUTPATIENT)
Dept: OUTPATIENT SERVICES | Facility: HOSPITAL | Age: 59
LOS: 1 days | End: 2024-05-08
Payer: COMMERCIAL

## 2024-05-08 ENCOUNTER — APPOINTMENT (OUTPATIENT)
Dept: CT IMAGING | Facility: IMAGING CENTER | Age: 59
End: 2024-05-08
Payer: COMMERCIAL

## 2024-05-08 DIAGNOSIS — Z90.79 ACQUIRED ABSENCE OF OTHER GENITAL ORGAN(S): Chronic | ICD-10-CM

## 2024-05-08 DIAGNOSIS — K43.2 INCISIONAL HERNIA WITHOUT OBSTRUCTION OR GANGRENE: ICD-10-CM

## 2024-05-08 PROCEDURE — 74177 CT ABD & PELVIS W/CONTRAST: CPT

## 2024-05-08 PROCEDURE — 74177 CT ABD & PELVIS W/CONTRAST: CPT | Mod: 26

## 2024-05-15 ENCOUNTER — NON-APPOINTMENT (OUTPATIENT)
Age: 59
End: 2024-05-15

## 2024-06-14 ENCOUNTER — APPOINTMENT (OUTPATIENT)
Dept: PULMONOLOGY | Facility: CLINIC | Age: 59
End: 2024-06-14

## 2024-06-14 VITALS
WEIGHT: 225 LBS | DIASTOLIC BLOOD PRESSURE: 82 MMHG | TEMPERATURE: 98 F | SYSTOLIC BLOOD PRESSURE: 124 MMHG | HEART RATE: 100 BPM | BODY MASS INDEX: 34.21 KG/M2 | OXYGEN SATURATION: 95 %

## 2024-06-14 PROCEDURE — 94060 EVALUATION OF WHEEZING: CPT

## 2024-06-14 PROCEDURE — 99214 OFFICE O/P EST MOD 30 MIN: CPT | Mod: 25

## 2024-06-14 PROCEDURE — 94729 DIFFUSING CAPACITY: CPT

## 2024-06-14 PROCEDURE — 94727 GAS DIL/WSHOT DETER LNG VOL: CPT

## 2024-06-15 NOTE — REASON FOR VISIT
[Follow-Up] : a follow-up visit [Pre-op Risk Stratification] : pre-op risk stratification [TextBox_44] : OAD

## 2024-06-15 NOTE — REVIEW OF SYSTEMS
[Nasal Congestion] : no nasal congestion [Cough] : no cough [Sputum] : no sputum [Dyspnea] : no dyspnea [Wheezing] : no wheezing [SOB on Exertion] : no sob on exertion [Arthralgias] : arthralgias [Negative] : Endocrine

## 2024-06-15 NOTE — PROCEDURE
[FreeTextEntry1] : PFT results: Mild decrease in lung volumes.  No significant bronchodilator response noted.

## 2024-06-15 NOTE — HISTORY OF PRESENT ILLNESS
[Former] : former [>= 20 pack years] : >= 20 pack years [TextBox_4] : 59-year-old male with history of OAD presents for preop pulmonary evaluation prior to umbilical hernia repair.  The patient's breathing has been "okay" on daily Anoro and montelukast with occasional albuterol use.  He denies fever, chills, chest pain, hemoptysis, night sweats or weight loss. [TextBox_11] : 1 1/2 [TextBox_13] : 20 [YearQuit] : 2010 [TextBox_29] : Denies snoring, daytime somnolence, apneic episodes, AM headaches

## 2024-06-15 NOTE — DISCUSSION/SUMMARY
[FreeTextEntry1] : 59-year-old male with history of OAD at baseline.  I reviewed the PFT results with the patient.  Patient is continue Anoro montelukast and albuterol as before.  At present there is no pulmonary contraindication for planned anesthesia and surgery.  He is to follow-up with his PMD as before.

## 2024-06-15 NOTE — PHYSICAL EXAM
[No Acute Distress] : no acute distress [Normal Oropharynx] : normal oropharynx [Normal Appearance] : normal appearance [No Neck Mass] : no neck mass [Normal Rate/Rhythm] : normal rate/rhythm [Normal S1, S2] : normal s1, s2 [No Murmurs] : no murmurs [No Resp Distress] : no resp distress [Clear to Auscultation Bilaterally] : clear to auscultation bilaterally [No Abnormalities] : no abnormalities [Normal Gait] : normal gait [No Clubbing] : no clubbing [No Cyanosis] : no cyanosis [No Edema] : no edema [FROM] : FROM [Normal Color/ Pigmentation] : normal color/ pigmentation [No Focal Deficits] : no focal deficits [Oriented x3] : oriented x3 [Normal Affect] : normal affect [TextBox_68] : Decreased breath sounds bilaterally [TextBox_89] : Umbilical hernia present.  Nontender

## 2024-06-19 ENCOUNTER — TRANSCRIPTION ENCOUNTER (OUTPATIENT)
Age: 59
End: 2024-06-19

## 2024-06-24 ENCOUNTER — OUTPATIENT (OUTPATIENT)
Dept: OUTPATIENT SERVICES | Facility: HOSPITAL | Age: 59
LOS: 1 days | End: 2024-06-24
Payer: COMMERCIAL

## 2024-06-24 VITALS
WEIGHT: 225.09 LBS | HEART RATE: 84 BPM | RESPIRATION RATE: 16 BRPM | DIASTOLIC BLOOD PRESSURE: 81 MMHG | SYSTOLIC BLOOD PRESSURE: 121 MMHG | OXYGEN SATURATION: 96 % | HEIGHT: 67 IN | TEMPERATURE: 98 F

## 2024-06-24 DIAGNOSIS — Z90.79 ACQUIRED ABSENCE OF OTHER GENITAL ORGAN(S): Chronic | ICD-10-CM

## 2024-06-24 DIAGNOSIS — Z87.09 PERSONAL HISTORY OF OTHER DISEASES OF THE RESPIRATORY SYSTEM: ICD-10-CM

## 2024-06-24 DIAGNOSIS — Z98.890 OTHER SPECIFIED POSTPROCEDURAL STATES: Chronic | ICD-10-CM

## 2024-06-24 DIAGNOSIS — K43.2 INCISIONAL HERNIA WITHOUT OBSTRUCTION OR GANGRENE: ICD-10-CM

## 2024-06-24 LAB
ANION GAP SERPL CALC-SCNC: 14 MMOL/L — SIGNIFICANT CHANGE UP (ref 5–17)
BLD GP AB SCN SERPL QL: NEGATIVE — SIGNIFICANT CHANGE UP
BUN SERPL-MCNC: 11 MG/DL — SIGNIFICANT CHANGE UP (ref 7–23)
CALCIUM SERPL-MCNC: 10.2 MG/DL — SIGNIFICANT CHANGE UP (ref 8.4–10.5)
CHLORIDE SERPL-SCNC: 103 MMOL/L — SIGNIFICANT CHANGE UP (ref 96–108)
CO2 SERPL-SCNC: 25 MMOL/L — SIGNIFICANT CHANGE UP (ref 22–31)
CREAT SERPL-MCNC: 0.9 MG/DL — SIGNIFICANT CHANGE UP (ref 0.5–1.3)
EGFR: 98 ML/MIN/1.73M2 — SIGNIFICANT CHANGE UP
GLUCOSE SERPL-MCNC: 109 MG/DL — HIGH (ref 70–99)
HCT VFR BLD CALC: 45.5 % — SIGNIFICANT CHANGE UP (ref 39–50)
HGB BLD-MCNC: 16 G/DL — SIGNIFICANT CHANGE UP (ref 13–17)
MCHC RBC-ENTMCNC: 31.7 PG — SIGNIFICANT CHANGE UP (ref 27–34)
MCHC RBC-ENTMCNC: 35.2 GM/DL — SIGNIFICANT CHANGE UP (ref 32–36)
MCV RBC AUTO: 90.3 FL — SIGNIFICANT CHANGE UP (ref 80–100)
NRBC # BLD: 0 /100 WBCS — SIGNIFICANT CHANGE UP (ref 0–0)
PLATELET # BLD AUTO: 363 K/UL — SIGNIFICANT CHANGE UP (ref 150–400)
POTASSIUM SERPL-MCNC: 3.4 MMOL/L — LOW (ref 3.5–5.3)
POTASSIUM SERPL-SCNC: 3.4 MMOL/L — LOW (ref 3.5–5.3)
RBC # BLD: 5.04 M/UL — SIGNIFICANT CHANGE UP (ref 4.2–5.8)
RBC # FLD: 12.3 % — SIGNIFICANT CHANGE UP (ref 10.3–14.5)
RH IG SCN BLD-IMP: POSITIVE — SIGNIFICANT CHANGE UP
SODIUM SERPL-SCNC: 142 MMOL/L — SIGNIFICANT CHANGE UP (ref 135–145)
WBC # BLD: 8.39 K/UL — SIGNIFICANT CHANGE UP (ref 3.8–10.5)
WBC # FLD AUTO: 8.39 K/UL — SIGNIFICANT CHANGE UP (ref 3.8–10.5)

## 2024-06-24 PROCEDURE — 85027 COMPLETE CBC AUTOMATED: CPT

## 2024-06-24 PROCEDURE — 87640 STAPH A DNA AMP PROBE: CPT

## 2024-06-24 PROCEDURE — 86850 RBC ANTIBODY SCREEN: CPT

## 2024-06-24 PROCEDURE — 86901 BLOOD TYPING SEROLOGIC RH(D): CPT

## 2024-06-24 PROCEDURE — 86900 BLOOD TYPING SEROLOGIC ABO: CPT

## 2024-06-24 PROCEDURE — 87641 MR-STAPH DNA AMP PROBE: CPT

## 2024-06-24 PROCEDURE — G0463: CPT

## 2024-06-24 PROCEDURE — 80048 BASIC METABOLIC PNL TOTAL CA: CPT

## 2024-06-24 RX ORDER — SODIUM CHLORIDE 0.9 % (FLUSH) 0.9 %
3 SYRINGE (ML) INJECTION EVERY 8 HOURS
Refills: 0 | Status: DISCONTINUED | OUTPATIENT
Start: 2024-07-15 | End: 2024-07-30

## 2024-06-24 RX ORDER — UMECLIDINIUM BROMIDE AND VILANTEROL TRIFENATATE 62.5; 25 UG/1; UG/1
0 POWDER RESPIRATORY (INHALATION)
Qty: 0 | Refills: 0 | DISCHARGE

## 2024-06-24 RX ORDER — DEXTROSE MONOHYDRATE AND SODIUM CHLORIDE 5; .3 G/100ML; G/100ML
1000 INJECTION, SOLUTION INTRAVENOUS
Refills: 0 | Status: DISCONTINUED | OUTPATIENT
Start: 2024-07-15 | End: 2024-07-30

## 2024-06-25 DIAGNOSIS — A49.01 METHICILLIN SUSCEPTIBLE STAPHYLOCOCCUS AUREUS INFECTION, UNSPECIFIED SITE: ICD-10-CM

## 2024-06-25 LAB
MRSA PCR RESULT.: SIGNIFICANT CHANGE UP
S AUREUS DNA NOSE QL NAA+PROBE: DETECTED

## 2024-06-25 RX ORDER — MUPIROCIN 20 MG/G
2 OINTMENT TOPICAL
Qty: 1 | Refills: 0 | Status: ACTIVE | COMMUNITY
Start: 2024-06-25 | End: 1900-01-01

## 2024-07-14 ENCOUNTER — TRANSCRIPTION ENCOUNTER (OUTPATIENT)
Age: 59
End: 2024-07-14

## 2024-07-15 ENCOUNTER — TRANSCRIPTION ENCOUNTER (OUTPATIENT)
Age: 59
End: 2024-07-15

## 2024-07-15 ENCOUNTER — APPOINTMENT (OUTPATIENT)
Dept: SURGERY | Facility: HOSPITAL | Age: 59
End: 2024-07-15
Payer: COMMERCIAL

## 2024-07-15 ENCOUNTER — RESULT REVIEW (OUTPATIENT)
Age: 59
End: 2024-07-15

## 2024-07-15 ENCOUNTER — OUTPATIENT (OUTPATIENT)
Dept: OUTPATIENT SERVICES | Facility: HOSPITAL | Age: 59
LOS: 1 days | End: 2024-07-15
Payer: COMMERCIAL

## 2024-07-15 VITALS
OXYGEN SATURATION: 93 % | SYSTOLIC BLOOD PRESSURE: 107 MMHG | HEART RATE: 71 BPM | RESPIRATION RATE: 15 BRPM | DIASTOLIC BLOOD PRESSURE: 70 MMHG

## 2024-07-15 VITALS
RESPIRATION RATE: 16 BRPM | DIASTOLIC BLOOD PRESSURE: 88 MMHG | HEIGHT: 67 IN | WEIGHT: 225.09 LBS | TEMPERATURE: 97 F | HEART RATE: 87 BPM | OXYGEN SATURATION: 96 % | SYSTOLIC BLOOD PRESSURE: 133 MMHG

## 2024-07-15 DIAGNOSIS — Z90.79 ACQUIRED ABSENCE OF OTHER GENITAL ORGAN(S): Chronic | ICD-10-CM

## 2024-07-15 DIAGNOSIS — Z98.890 OTHER SPECIFIED POSTPROCEDURAL STATES: Chronic | ICD-10-CM

## 2024-07-15 DIAGNOSIS — K43.2 INCISIONAL HERNIA WITHOUT OBSTRUCTION OR GANGRENE: ICD-10-CM

## 2024-07-15 LAB — RH IG SCN BLD-IMP: POSITIVE — SIGNIFICANT CHANGE UP

## 2024-07-15 PROCEDURE — 49591 RPR AA HRN 1ST < 3 CM RDC: CPT

## 2024-07-15 PROCEDURE — C9399: CPT

## 2024-07-15 PROCEDURE — 88302 TISSUE EXAM BY PATHOLOGIST: CPT

## 2024-07-15 PROCEDURE — C1781: CPT

## 2024-07-15 PROCEDURE — 88302 TISSUE EXAM BY PATHOLOGIST: CPT | Mod: 26

## 2024-07-15 PROCEDURE — 49613 RPR AA HRN RCR < 3 RDC: CPT

## 2024-07-15 DEVICE — MESH HERNIA TISS REINF 8X8CM: Type: IMPLANTABLE DEVICE | Status: FUNCTIONAL

## 2024-07-15 DEVICE — MESH HERNIA VENTRAL PROCEED CIRCLE 6.4CM: Type: IMPLANTABLE DEVICE | Status: FUNCTIONAL

## 2024-07-15 DEVICE — MESH HERNIA VENTRAL PROCEED CIRCLE 4.3CM: Type: IMPLANTABLE DEVICE | Status: FUNCTIONAL

## 2024-07-15 RX ORDER — FENTANYL CITRATE 50 UG/ML
25 INJECTION, SOLUTION INTRAMUSCULAR; INTRAVENOUS
Refills: 0 | Status: DISCONTINUED | OUTPATIENT
Start: 2024-07-15 | End: 2024-07-15

## 2024-07-15 RX ORDER — ALLOPURINOL 300 MG/1
2 TABLET ORAL
Refills: 0 | DISCHARGE

## 2024-07-15 RX ORDER — OXYCODONE HYDROCHLORIDE 100 MG/5ML
1 SOLUTION ORAL
Qty: 6 | Refills: 0
Start: 2024-07-15

## 2024-07-15 RX ORDER — LIDOCAINE HYDROCHLORIDE 20 MG/ML
0.2 INJECTION, SOLUTION EPIDURAL; INFILTRATION; INTRACAUDAL; PERINEURAL ONCE
Refills: 0 | Status: COMPLETED | OUTPATIENT
Start: 2024-07-15 | End: 2024-07-15

## 2024-07-15 RX ORDER — ONDANSETRON HYDROCHLORIDE 2 MG/ML
4 INJECTION INTRAMUSCULAR; INTRAVENOUS ONCE
Refills: 0 | Status: DISCONTINUED | OUTPATIENT
Start: 2024-07-15 | End: 2024-07-30

## 2024-07-15 RX ORDER — CEFAZOLIN 10 G/1
2000 INJECTION, POWDER, FOR SOLUTION INTRAVENOUS ONCE
Refills: 0 | Status: COMPLETED | OUTPATIENT
Start: 2024-07-15 | End: 2024-07-15

## 2024-07-15 RX ORDER — AMLODIPINE BESYLATE 2.5 MG/1
1 TABLET ORAL
Refills: 0 | DISCHARGE

## 2024-07-15 RX ORDER — ASPIRIN 325 MG/1
0 TABLET, FILM COATED ORAL
Refills: 0 | DISCHARGE

## 2024-07-15 RX ORDER — MONTELUKAST SODIUM 10 MG/1
10 TABLET, FILM COATED ORAL
Refills: 0 | DISCHARGE

## 2024-07-15 RX ORDER — UMECLIDINIUM BROMIDE AND VILANTEROL TRIFENATATE 62.5; 25 UG/1; UG/1
1 POWDER RESPIRATORY (INHALATION)
Refills: 0 | DISCHARGE

## 2024-07-15 RX ORDER — ALBUTEROL 90 MCG
2 AEROSOL REFILL (GRAM) INHALATION
Refills: 0 | DISCHARGE

## 2024-07-15 RX ADMIN — DEXTROSE MONOHYDRATE AND SODIUM CHLORIDE 100 MILLILITER(S): 5; .3 INJECTION, SOLUTION INTRAVENOUS at 13:36

## 2024-07-15 RX ADMIN — Medication 3 MILLILITER(S): at 13:09

## 2024-07-15 RX ADMIN — Medication 1 APPLICATION(S): at 13:36

## 2024-07-15 NOTE — ASU DISCHARGE PLAN (ADULT/PEDIATRIC) - CARE PROVIDER_API CALL
Anticoagulation Summary  As of 2021    INR goal:  2.0-3.0   TTR:  63.7 % (6.3 y)   INR used for dosin.30 (2021)   Warfarin maintenance plan:  7.5 mg (5 mg x 1.5) every Mon, Fri; 5 mg (5 mg x 1) all other days   Weekly warfarin total:  40 mg   Plan last modified:  Claudia FerroD (3/31/2021)   Next INR check:  10/1/2021   Priority:  Maintenance   Target end date:  Indefinite    Indications    Deep vein thrombosis [453.40] [I82.409]             Anticoagulation Episode Summary     INR check location:  Home Draw    Preferred lab:      Send INR reminders to:      Comments:  Eloy MYRICK      Anticoagulation Care Providers     Provider Role Specialty Phone number    Bruna Ureña M.D. Referring Cardiovascular Disease (Cardiology) [phone]    [facility]ticoagulation Services Responsible  [phone]    Jaquan Horne M.D.  Family Medicine [phone]        Anticoagulation Patient Findings    Left voicemail message to report a therapeutic INR of 2.3.  Pt to continue with current warfarin dosing regimen. Requested pt contact the clinic for any s/s of unusual bleeding, bruising, clotting or any changes to diet or medication. FU 2 weeks.  Alexis Garcia, PharmD, BCACP      
Yuri Small  Colon/Rectal Surgery  [address] [facility] [facility] [address]  [place] [address]  Phone: [phone]  Fax: [phone]  Follow Up Time: 2 weeks

## 2024-07-15 NOTE — ASU PATIENT PROFILE, ADULT - NSICDXPASTMEDICALHX_GEN_ALL_CORE_FT
PAST MEDICAL HISTORY:  COPD (chronic obstructive pulmonary disease)     H/O umbilical hernia repair     Mild persistent asthma     Prostate cancer

## 2024-07-15 NOTE — BRIEF OPERATIVE NOTE - OPERATION/FINDINGS
Recurrent ventral incisional hernia, right of umbilicus  Hernia sac containing fat  Open repair with mesh

## 2024-07-15 NOTE — ASU PATIENT PROFILE, ADULT - FALL HARM RISK - UNIVERSAL INTERVENTIONS
Bed in lowest position, wheels locked, appropriate side rails in place/Call bell, personal items and telephone in reach/Instruct patient to call for assistance before getting out of bed or chair/Non-slip footwear when patient is out of bed/[place] to call system/Physically safe environment - no spills, clutter or unnecessary equipment/Purposeful Proactive Rounding/Room/bathroom lighting operational, light cord in reach

## 2024-07-15 NOTE — ASU DISCHARGE PLAN (ADULT/PEDIATRIC) - ASU DC SPECIAL INSTRUCTIONSFT
WOUND CARE: Keep incision clean and dry.  Cover with dry, sterile dressing as needed. Outer dressings may be removed in 24 hours. Steri-strips underneath should remain in place and be allowed to fall off on their own.   BATHING: Please do not submerge wound underwater. You may shower and/or sponge bathe starting 48 hours after surgery. Do not scrub incision, pat dry.   ACTIVITY: No heavy lifting or straining. Otherwise, you may return to your usual level of physical activity. If you are taking narcotic pain medication (such as Oxycodone), do NOT drive a car, operate machinery or make important decisions.  PAIN MEDICATIONS: For pain control, you may take over the counter Tylenol every 6 hours. Do not exceed 4,000 mg of Tylenol per day. You may also alternate with taking Motrin every 6 hours. You may time these medications so that you are alternating between Tylenol and Motrin, and taking something for pain every 3 hours. For severe, uncontrolled pain, you may take Oxycodone as prescribed. A prescription has been sent to your pharmacy.   DIET: Resume home diet.  NOTIFY YOUR SURGEON IF: You have any bleeding that does not stop, any pus draining from your wound, any fever (over 100.4 F) or chills, persistent nausea/vomiting, persistent diarrhea, or if your pain is not controlled on your discharge pain medications.  FOLLOW-UP:   Please call and schedule a follow up appointment ~2 weeks after discharge. with Dr. Small. Please call the office for appointment. WOUND CARE: Keep incision clean and dry.  Cover with dry, sterile dressing as needed. Outer dressings may be removed in 24 hours. Steri-strips underneath should remain in place and be allowed to fall off on their own.   ******************************************************************************************   BATHING: Please do not submerge wound underwater. You may shower and/or sponge bathe starting 48 hours after surgery. Do not scrub incision, pat dry.   ******************************************************************************************   ACTIVITY: No heavy lifting or straining. Otherwise, you may return to your usual level of physical activity. If you are taking narcotic pain medication (such as Oxycodone), do NOT drive a car, operate machinery or make important decisions.  ******************************************************************************************   PAIN MEDICATIONS: For pain control, you may take over the counter Tylenol every 6 hours. Do not exceed 4,000 mg of Tylenol per day. You may also alternate with taking Motrin every 6 hours. You may time these medications so that you are alternating between Tylenol and Motrin, and taking something for pain every 3 hours. For severe, uncontrolled pain, you may take Oxycodone as prescribed. A prescription has been sent to your pharmacy.   ******************************************************************************************   DIET: Resume home diet.  ******************************************************************************************   NOTIFY YOUR SURGEON IF: You have any bleeding that does not stop, any pus draining from your wound, any fever (over 100.4 F) or chills, persistent nausea/vomiting, persistent diarrhea, or if your pain is not controlled on your discharge pain medications.  ******************************************************************************************   FOLLOW-UP: Please call and schedule a follow up appointment ~2 weeks after discharge. with Dr. Small. Please call the office for appointment.

## 2024-07-15 NOTE — ASU DISCHARGE PLAN (ADULT/PEDIATRIC) - NURSING INSTRUCTIONS
OK to take Tylenol/Acetaminophen at 8PM TONIGHT (last dose @  2PM  in operating room)  for pain and every 6 hours after as needed. OK to take Motrin/Ibuprofen at ANY TIME TODAY for pain and every 6 hours after as needed.

## 2024-07-15 NOTE — ASU DISCHARGE PLAN (ADULT/PEDIATRIC) - NS MD DC FALL RISK RISK
For information on Fall & Injury Prevention, visit: [email or web address] OR  [email or web address] OR  https://www.cdc.gov/steadi/patient.html

## 2024-07-15 NOTE — PRE-ANESTHESIA EVALUATION ADULT - NSANTHOSAYNRD_GEN_A_CORE
denies/No. JOANNA screening performed.  STOP BANG Legend: 0-2 = LOW Risk; 3-4 = INTERMEDIATE Risk; 5-8 = HIGH Risk

## 2024-07-19 LAB — SURGICAL PATHOLOGY STUDY: SIGNIFICANT CHANGE UP

## 2024-07-30 ENCOUNTER — APPOINTMENT (OUTPATIENT)
Dept: SURGERY | Facility: CLINIC | Age: 59
End: 2024-07-30

## 2024-07-30 VITALS
TEMPERATURE: 97.2 F | SYSTOLIC BLOOD PRESSURE: 119 MMHG | RESPIRATION RATE: 17 BRPM | OXYGEN SATURATION: 94 % | HEART RATE: 90 BPM | DIASTOLIC BLOOD PRESSURE: 80 MMHG

## 2024-07-30 DIAGNOSIS — Z09 ENCOUNTER FOR FOLLOW-UP EXAMINATION AFTER COMPLETED TREATMENT FOR CONDITIONS OTHER THAN MALIGNANT NEOPLASM: ICD-10-CM

## 2024-07-30 PROCEDURE — 99212 OFFICE O/P EST SF 10 MIN: CPT

## 2024-07-30 NOTE — ASSESSMENT
[FreeTextEntry1] : In summary the patient is doing well status post incisional hernia repair with mesh.  I instructed him that he may resume his normal activities as tolerated and only needs to see me as needed

## 2024-07-30 NOTE — HISTORY OF PRESENT ILLNESS
[de-identified] : Faisal is a 58 y/o male being seen for a post-op visit, Incisional hernia repair with mesh (PVPM) on 7/15/24.  Pathology - 1. Incisional hernia sac, excision: - Mesothelial-lined fibroconnective tissue with reactive changes.  Today pt reports no pain. Denies drainage, bleeding, swelling, and redness of surgical incision. Denies nausea and vomiting. Denies fever and chills. Normal BM, denies constipation or diarrhea. Good appetite.

## 2024-07-30 NOTE — HISTORY OF PRESENT ILLNESS
[de-identified] : Faisal is a 60 y/o male being seen for a post-op visit, Incisional hernia repair with mesh (PVPM) on 7/15/24.  Pathology - 1. Incisional hernia sac, excision: - Mesothelial-lined fibroconnective tissue with reactive changes.  Today pt reports no pain. Denies drainage, bleeding, swelling, and redness of surgical incision. Denies nausea and vomiting. Denies fever and chills. Normal BM, denies constipation or diarrhea. Good appetite.

## 2024-10-15 ENCOUNTER — APPOINTMENT (OUTPATIENT)
Dept: PULMONOLOGY | Facility: CLINIC | Age: 59
End: 2024-10-15

## 2024-10-18 NOTE — ASU PATIENT PROFILE, ADULT - NSTOBACCO TYPE_GEN_A_CORE_RD
How Severe Is Your Skin Lesion?: mild
Is This A New Presentation, Or A Follow-Up?: Skin Lesion
Cigarettes

## 2024-11-18 PROBLEM — Z98.890 OTHER SPECIFIED POSTPROCEDURAL STATES: Chronic | Status: ACTIVE | Noted: 2024-06-24

## 2024-11-18 PROBLEM — J45.30 MILD PERSISTENT ASTHMA, UNCOMPLICATED: Chronic | Status: ACTIVE | Noted: 2024-06-24

## 2024-11-19 ENCOUNTER — APPOINTMENT (OUTPATIENT)
Dept: PULMONOLOGY | Facility: CLINIC | Age: 59
End: 2024-11-19
Payer: COMMERCIAL

## 2024-11-19 VITALS
HEIGHT: 68 IN | WEIGHT: 229 LBS | OXYGEN SATURATION: 94 % | DIASTOLIC BLOOD PRESSURE: 80 MMHG | SYSTOLIC BLOOD PRESSURE: 120 MMHG | BODY MASS INDEX: 34.71 KG/M2 | HEART RATE: 98 BPM | TEMPERATURE: 97.6 F

## 2024-11-19 PROCEDURE — 99214 OFFICE O/P EST MOD 30 MIN: CPT

## 2024-12-23 ENCOUNTER — INPATIENT (INPATIENT)
Facility: HOSPITAL | Age: 59
LOS: 3 days | Discharge: HOME | DRG: 192 | End: 2024-12-27
Attending: GENERAL ACUTE CARE HOSPITAL | Admitting: GENERAL ACUTE CARE HOSPITAL
Payer: COMMERCIAL

## 2024-12-23 VITALS
OXYGEN SATURATION: 95 % | SYSTOLIC BLOOD PRESSURE: 103 MMHG | DIASTOLIC BLOOD PRESSURE: 85 MMHG | TEMPERATURE: 99 F | HEIGHT: 64 IN | HEART RATE: 108 BPM | RESPIRATION RATE: 20 BRPM | WEIGHT: 225.09 LBS

## 2024-12-23 DIAGNOSIS — J44.1 CHRONIC OBSTRUCTIVE PULMONARY DISEASE WITH (ACUTE) EXACERBATION: ICD-10-CM

## 2024-12-23 DIAGNOSIS — Z90.79 ACQUIRED ABSENCE OF OTHER GENITAL ORGAN(S): Chronic | ICD-10-CM

## 2024-12-23 DIAGNOSIS — Z98.890 OTHER SPECIFIED POSTPROCEDURAL STATES: Chronic | ICD-10-CM

## 2024-12-23 LAB
ALBUMIN SERPL ELPH-MCNC: 4.4 G/DL — SIGNIFICANT CHANGE UP (ref 3.3–5)
ALP SERPL-CCNC: 61 U/L — SIGNIFICANT CHANGE UP (ref 40–120)
ALT FLD-CCNC: 66 U/L — HIGH (ref 10–45)
ANION GAP SERPL CALC-SCNC: 15 MMOL/L — SIGNIFICANT CHANGE UP (ref 5–17)
APTT BLD: 28.7 SEC — SIGNIFICANT CHANGE UP (ref 24.5–35.6)
AST SERPL-CCNC: 47 U/L — HIGH (ref 10–40)
BASOPHILS # BLD AUTO: 0 K/UL — SIGNIFICANT CHANGE UP (ref 0–0.2)
BASOPHILS NFR BLD AUTO: 0 % — SIGNIFICANT CHANGE UP (ref 0–2)
BILIRUB SERPL-MCNC: 0.5 MG/DL — SIGNIFICANT CHANGE UP (ref 0.2–1.2)
BUN SERPL-MCNC: 15 MG/DL — SIGNIFICANT CHANGE UP (ref 7–23)
CALCIUM SERPL-MCNC: 9.2 MG/DL — SIGNIFICANT CHANGE UP (ref 8.4–10.5)
CHLORIDE SERPL-SCNC: 100 MMOL/L — SIGNIFICANT CHANGE UP (ref 96–108)
CO2 SERPL-SCNC: 22 MMOL/L — SIGNIFICANT CHANGE UP (ref 22–31)
CREAT SERPL-MCNC: 0.96 MG/DL — SIGNIFICANT CHANGE UP (ref 0.5–1.3)
D DIMER BLD IA.RAPID-MCNC: <150 NG/ML DDU — SIGNIFICANT CHANGE UP
EGFR: 90 ML/MIN/1.73M2 — SIGNIFICANT CHANGE UP
EOSINOPHIL # BLD AUTO: 0 K/UL — SIGNIFICANT CHANGE UP (ref 0–0.5)
EOSINOPHIL NFR BLD AUTO: 0 % — SIGNIFICANT CHANGE UP (ref 0–6)
FLUAV AG NPH QL: SIGNIFICANT CHANGE UP
FLUBV AG NPH QL: SIGNIFICANT CHANGE UP
GAS PNL BLDV: SIGNIFICANT CHANGE UP
GLUCOSE SERPL-MCNC: 121 MG/DL — HIGH (ref 70–99)
HCT VFR BLD CALC: 47.1 % — SIGNIFICANT CHANGE UP (ref 39–50)
HGB BLD-MCNC: 16 G/DL — SIGNIFICANT CHANGE UP (ref 13–17)
INR BLD: 1.1 RATIO — SIGNIFICANT CHANGE UP (ref 0.85–1.16)
LYMPHOCYTES # BLD AUTO: 0.11 K/UL — LOW (ref 1–3.3)
LYMPHOCYTES # BLD AUTO: 0.8 % — LOW (ref 13–44)
MANUAL SMEAR VERIFICATION: SIGNIFICANT CHANGE UP
MCHC RBC-ENTMCNC: 31.9 PG — SIGNIFICANT CHANGE UP (ref 27–34)
MCHC RBC-ENTMCNC: 34 G/DL — SIGNIFICANT CHANGE UP (ref 32–36)
MCV RBC AUTO: 93.8 FL — SIGNIFICANT CHANGE UP (ref 80–100)
MONOCYTES # BLD AUTO: 0.89 K/UL — SIGNIFICANT CHANGE UP (ref 0–0.9)
MONOCYTES NFR BLD AUTO: 6.4 % — SIGNIFICANT CHANGE UP (ref 2–14)
NEUTROPHILS # BLD AUTO: 12.84 K/UL — HIGH (ref 1.8–7.4)
NEUTROPHILS NFR BLD AUTO: 86.3 % — HIGH (ref 43–77)
NEUTS BAND # BLD: 6.5 % — SIGNIFICANT CHANGE UP (ref 0–8)
NT-PROBNP SERPL-SCNC: 59 PG/ML — SIGNIFICANT CHANGE UP (ref 0–300)
PLAT MORPH BLD: NORMAL — SIGNIFICANT CHANGE UP
PLATELET # BLD AUTO: 268 K/UL — SIGNIFICANT CHANGE UP (ref 150–400)
POTASSIUM SERPL-MCNC: 4 MMOL/L — SIGNIFICANT CHANGE UP (ref 3.5–5.3)
POTASSIUM SERPL-SCNC: 4 MMOL/L — SIGNIFICANT CHANGE UP (ref 3.5–5.3)
PROT SERPL-MCNC: 7.1 G/DL — SIGNIFICANT CHANGE UP (ref 6–8.3)
PROTHROM AB SERPL-ACNC: 12.6 SEC — SIGNIFICANT CHANGE UP (ref 9.9–13.4)
RBC # BLD: 5.02 M/UL — SIGNIFICANT CHANGE UP (ref 4.2–5.8)
RBC # FLD: 12.5 % — SIGNIFICANT CHANGE UP (ref 10.3–14.5)
RBC BLD AUTO: NORMAL — SIGNIFICANT CHANGE UP
RSV RNA NPH QL NAA+NON-PROBE: SIGNIFICANT CHANGE UP
SARS-COV-2 RNA SPEC QL NAA+PROBE: SIGNIFICANT CHANGE UP
SODIUM SERPL-SCNC: 137 MMOL/L — SIGNIFICANT CHANGE UP (ref 135–145)
TROPONIN T, HIGH SENSITIVITY RESULT: <6 NG/L — SIGNIFICANT CHANGE UP (ref 0–51)
WBC # BLD: 13.84 K/UL — HIGH (ref 3.8–10.5)
WBC # FLD AUTO: 13.84 K/UL — HIGH (ref 3.8–10.5)

## 2024-12-23 PROCEDURE — 99223 1ST HOSP IP/OBS HIGH 75: CPT

## 2024-12-23 PROCEDURE — 71045 X-RAY EXAM CHEST 1 VIEW: CPT | Mod: 26

## 2024-12-23 PROCEDURE — 99285 EMERGENCY DEPT VISIT HI MDM: CPT

## 2024-12-23 RX ORDER — GINKGO BILOBA 40 MG
3 CAPSULE ORAL AT BEDTIME
Refills: 0 | Status: DISCONTINUED | OUTPATIENT
Start: 2024-12-23 | End: 2024-12-27

## 2024-12-23 RX ORDER — METHYLPREDNISOLONE 4 MG/1
125 TABLET ORAL ONCE
Refills: 0 | Status: COMPLETED | OUTPATIENT
Start: 2024-12-23 | End: 2024-12-23

## 2024-12-23 RX ORDER — METHYLPREDNISOLONE 4 MG/1
40 TABLET ORAL EVERY 12 HOURS
Refills: 0 | Status: DISCONTINUED | OUTPATIENT
Start: 2024-12-23 | End: 2024-12-25

## 2024-12-23 RX ORDER — ENOXAPARIN SODIUM 60 MG/.6ML
40 INJECTION INTRAVENOUS; SUBCUTANEOUS EVERY 12 HOURS
Refills: 0 | Status: DISCONTINUED | OUTPATIENT
Start: 2024-12-23 | End: 2024-12-27

## 2024-12-23 RX ORDER — IPRATROPIUM BROMIDE AND ALBUTEROL SULFATE .5; 2.5 MG/3ML; MG/3ML
3 SOLUTION RESPIRATORY (INHALATION)
Refills: 0 | Status: DISCONTINUED | OUTPATIENT
Start: 2024-12-23 | End: 2024-12-27

## 2024-12-23 RX ORDER — ACETAMINOPHEN 80 MG/.8ML
650 SOLUTION/ DROPS ORAL EVERY 6 HOURS
Refills: 0 | Status: DISCONTINUED | OUTPATIENT
Start: 2024-12-23 | End: 2024-12-27

## 2024-12-23 RX ORDER — SODIUM CHLORIDE 9 MG/ML
1000 INJECTION, SOLUTION INTRAMUSCULAR; INTRAVENOUS; SUBCUTANEOUS ONCE
Refills: 0 | Status: COMPLETED | OUTPATIENT
Start: 2024-12-23 | End: 2024-12-23

## 2024-12-23 RX ORDER — IPRATROPIUM BROMIDE AND ALBUTEROL SULFATE .5; 2.5 MG/3ML; MG/3ML
3 SOLUTION RESPIRATORY (INHALATION) EVERY 6 HOURS
Refills: 0 | Status: DISCONTINUED | OUTPATIENT
Start: 2024-12-23 | End: 2024-12-27

## 2024-12-23 RX ORDER — LEVALBUTEROL 1.25 MG/3ML
2 SOLUTION RESPIRATORY (INHALATION)
Refills: 0 | DISCHARGE

## 2024-12-23 RX ADMIN — IPRATROPIUM BROMIDE AND ALBUTEROL SULFATE 3 MILLILITER(S): .5; 2.5 SOLUTION RESPIRATORY (INHALATION) at 15:10

## 2024-12-23 RX ADMIN — METHYLPREDNISOLONE 125 MILLIGRAM(S): 4 TABLET ORAL at 16:44

## 2024-12-23 RX ADMIN — IPRATROPIUM BROMIDE AND ALBUTEROL SULFATE 3 MILLILITER(S): .5; 2.5 SOLUTION RESPIRATORY (INHALATION) at 15:32

## 2024-12-23 RX ADMIN — SODIUM CHLORIDE 1000 MILLILITER(S): 9 INJECTION, SOLUTION INTRAMUSCULAR; INTRAVENOUS; SUBCUTANEOUS at 16:54

## 2024-12-23 NOTE — ED PROVIDER NOTE - PHYSICAL EXAMINATION
A&Ox3,  Lungs poor movement of air throughout with crackles to BL bases, tachypneic but speaking in full sentences, no retractions.   Cardiac  RRR  Abd soft, NT/ND, no rebound or guarding.   Extremities: cap refill <2, pulses in distal extremities 4+, no edema.   Skin without rash.   No focal Deficits

## 2024-12-23 NOTE — PATIENT PROFILE ADULT - PATIENT'S PREFERRED PRONOUN
Pt back from CT without incident. Pt became much more alert in CT, attempting to pull at cpap mask, pt now attempting to talk to staff. Him/He

## 2024-12-23 NOTE — H&P ADULT - PROBLEM SELECTOR PLAN 3
Elevated transaminases, wnl on previous studies in [facility]  - F/u RUQ US  - F/u Hepatitis panel, CMV, EBV serologies

## 2024-12-23 NOTE — PATIENT PROFILE ADULT - FALL HARM RISK - UNIVERSAL INTERVENTIONS
Bed in lowest position, wheels locked, appropriate side rails in place/Call bell, personal items and telephone in reach/Instruct patient to call for assistance before getting out of bed or chair/Non-slip footwear when patient is out of bed/[place] to call system/Physically safe environment - no spills, clutter or unnecessary equipment/Purposeful Proactive Rounding/Room/bathroom lighting operational, light cord in reach

## 2024-12-23 NOTE — ED ADULT NURSE NOTE - OBJECTIVE STATEMENT
59 year old male coming to ED complaining of shortness of breath for one day. PMH COPD. A&Ox4. Patient ambulatory. Patient states he has been feeling "under the weather" for the past couple of days. Patient states that he was having trouble breathing on exertion and it has increased to now having trouble breathing at rest. Patient denies chest pain, nausea, vomiting, dizziness, headache.

## 2024-12-23 NOTE — H&P ADULT - HISTORY OF PRESENT ILLNESS
59M w/Hx of COPD, HTN, Gout presents due to SOB and generalized malaise. Patient reports he was in his normal state of health until this morning where he felt SOB walking at work. He denies cough, congestion, wheezing. Has not smoked in 15 years. Patient used his levalbuterol inhaler this morning, but did not help him. On my exam, he reports his condition has improved since arrival to ED. He is breathing comfortably on 2L NC. Patient denies fever, chills, headache, dizziness, abd pain, nausea, vomiting, constipation, dysuria. Denies sick contacts.

## 2024-12-23 NOTE — ED PROVIDER NOTE - OBJECTIVE STATEMENT
59-year-old male with PMH COPD here for evaluation of worsening shortness of breath starting this morning.  Patient states he was going to work when he started to feel short of breath.  Denies any recent cough or fevers, has been using his albuterol inhaler without improvement.  Denies any sick contacts, denies chest pain, abdominal pain, diaphoresis, pedal edema, recent long car rides or travel.

## 2024-12-23 NOTE — H&P ADULT - PROBLEM SELECTOR PLAN 1
Pt requiring 2L NC at bedside  - Titrate down to room air as tolerated   - Maintain O2>88%  - Likely 2/2 to COPD exacerbation  - Possible underlying viral infection, but patient currently asymptomatic other than SOB  - CXR: Clear lungs

## 2024-12-23 NOTE — ED ADULT TRIAGE NOTE - BP NONINVASIVE DIASTOLIC (MM HG)
85 Wartpeel Counseling:  I discussed with the patient the risks of Wartpeel including but not limited to erythema, scaling, itching, weeping, crusting, and pain.

## 2024-12-23 NOTE — H&P ADULT - PROBLEM SELECTOR PLAN 2
- Duonebs  - Solu-medrol 125mg x1 given  - Solu-medrol 40mg q12h  - Use advair inhaler in place of home anoro ellipta  - Continue montelukast

## 2024-12-23 NOTE — H&P ADULT - NSHPPHYSICALEXAM_GEN_ALL_CORE
T(C): 36.9 (12-24-24 @ 00:13), Max: 37.2 (12-23-24 @ 13:23)  HR: 95 (12-24-24 @ 00:13) (87 - 111)  BP: 134/81 (12-24-24 @ 00:13) (103/85 - 134/81)  RR: 18 (12-24-24 @ 00:13) (18 - 20)  SpO2: 95% (12-24-24 @ 00:13) (91% - 95%)    CONSTITUTIONAL: No apparent distress  EYES: PERRLA and symmetric, EOMI, No conjunctival or scleral injection, non-icteric  ENMT: Oral mucosa with moist membranes.  NECK: Supple, symmetric. No JVD.  RESP: No respiratory distress, no use of accessory muscles; CTA b/l, no WRR  CV: RRR, +S1S2, no MRG  GI: Soft, NT, ND, no rebound, no guarding  : No suprapubic tenderness. No CVA tenderness.  LYMPH: No cervical LAD or tenderness  MSK: No spinal tenderness, normal muscle strength/tone  EXTREMITIES: No pedal edema  SKIN: No rashes or ulcers noted  NEURO: A+Ox3, responsive. No tremor  PSYCH: Appropriate insight/judgment; mood and affect appropriate

## 2024-12-24 ENCOUNTER — TRANSCRIPTION ENCOUNTER (OUTPATIENT)
Age: 59
End: 2024-12-24

## 2024-12-24 DIAGNOSIS — M10.9 GOUT, UNSPECIFIED: ICD-10-CM

## 2024-12-24 DIAGNOSIS — R74.01 ELEVATION OF LEVELS OF LIVER TRANSAMINASE LEVELS: ICD-10-CM

## 2024-12-24 DIAGNOSIS — J96.01 ACUTE RESPIRATORY FAILURE WITH HYPOXIA: ICD-10-CM

## 2024-12-24 DIAGNOSIS — I10 ESSENTIAL (PRIMARY) HYPERTENSION: ICD-10-CM

## 2024-12-24 DIAGNOSIS — J44.1 CHRONIC OBSTRUCTIVE PULMONARY DISEASE WITH (ACUTE) EXACERBATION: ICD-10-CM

## 2024-12-24 LAB
A1C WITH ESTIMATED AVERAGE GLUCOSE RESULT: 5.9 % — HIGH (ref 4–5.6)
ALBUMIN SERPL ELPH-MCNC: 4.2 G/DL — SIGNIFICANT CHANGE UP (ref 3.3–5)
ALP SERPL-CCNC: 58 U/L — SIGNIFICANT CHANGE UP (ref 40–120)
ALT FLD-CCNC: 59 U/L — HIGH (ref 10–45)
ANION GAP SERPL CALC-SCNC: 13 MMOL/L — SIGNIFICANT CHANGE UP (ref 5–17)
AST SERPL-CCNC: 40 U/L — SIGNIFICANT CHANGE UP (ref 10–40)
BILIRUB SERPL-MCNC: 0.4 MG/DL — SIGNIFICANT CHANGE UP (ref 0.2–1.2)
BUN SERPL-MCNC: 15 MG/DL — SIGNIFICANT CHANGE UP (ref 7–23)
CALCIUM SERPL-MCNC: 9.3 MG/DL — SIGNIFICANT CHANGE UP (ref 8.4–10.5)
CHLORIDE SERPL-SCNC: 103 MMOL/L — SIGNIFICANT CHANGE UP (ref 96–108)
CHOLEST SERPL-MCNC: 167 MG/DL — SIGNIFICANT CHANGE UP
CO2 SERPL-SCNC: 22 MMOL/L — SIGNIFICANT CHANGE UP (ref 22–31)
CREAT SERPL-MCNC: 0.72 MG/DL — SIGNIFICANT CHANGE UP (ref 0.5–1.3)
EGFR: 105 ML/MIN/1.73M2 — SIGNIFICANT CHANGE UP
ESTIMATED AVERAGE GLUCOSE: 123 MG/DL — HIGH (ref 68–114)
GLUCOSE SERPL-MCNC: 165 MG/DL — HIGH (ref 70–99)
HAV IGM SER-ACNC: SIGNIFICANT CHANGE UP
HBV CORE IGM SER-ACNC: SIGNIFICANT CHANGE UP
HBV SURFACE AG SER-ACNC: SIGNIFICANT CHANGE UP
HCT VFR BLD CALC: 45.3 % — SIGNIFICANT CHANGE UP (ref 39–50)
HCV AB S/CO SERPL IA: 0.12 S/CO — SIGNIFICANT CHANGE UP (ref 0–0.99)
HCV AB SERPL-IMP: SIGNIFICANT CHANGE UP
HDLC SERPL-MCNC: 41 MG/DL — SIGNIFICANT CHANGE UP
HGB BLD-MCNC: 15.5 G/DL — SIGNIFICANT CHANGE UP (ref 13–17)
LIPID PNL WITH DIRECT LDL SERPL: 112 MG/DL — HIGH
MCHC RBC-ENTMCNC: 31.7 PG — SIGNIFICANT CHANGE UP (ref 27–34)
MCHC RBC-ENTMCNC: 34.2 G/DL — SIGNIFICANT CHANGE UP (ref 32–36)
MCV RBC AUTO: 92.6 FL — SIGNIFICANT CHANGE UP (ref 80–100)
NON HDL CHOLESTEROL: 126 MG/DL — SIGNIFICANT CHANGE UP
NRBC # BLD: 0 /100 WBCS — SIGNIFICANT CHANGE UP (ref 0–0)
PLATELET # BLD AUTO: 253 K/UL — SIGNIFICANT CHANGE UP (ref 150–400)
POTASSIUM SERPL-MCNC: 3.9 MMOL/L — SIGNIFICANT CHANGE UP (ref 3.5–5.3)
POTASSIUM SERPL-SCNC: 3.9 MMOL/L — SIGNIFICANT CHANGE UP (ref 3.5–5.3)
PROT SERPL-MCNC: 6.6 G/DL — SIGNIFICANT CHANGE UP (ref 6–8.3)
RBC # BLD: 4.89 M/UL — SIGNIFICANT CHANGE UP (ref 4.2–5.8)
RBC # FLD: 12.7 % — SIGNIFICANT CHANGE UP (ref 10.3–14.5)
SODIUM SERPL-SCNC: 138 MMOL/L — SIGNIFICANT CHANGE UP (ref 135–145)
TRIGL SERPL-MCNC: 75 MG/DL — SIGNIFICANT CHANGE UP
WBC # BLD: 8.59 K/UL — SIGNIFICANT CHANGE UP (ref 3.8–10.5)
WBC # FLD AUTO: 8.59 K/UL — SIGNIFICANT CHANGE UP (ref 3.8–10.5)

## 2024-12-24 PROCEDURE — 93010 ELECTROCARDIOGRAM REPORT: CPT

## 2024-12-24 PROCEDURE — 76705 ECHO EXAM OF ABDOMEN: CPT | Mod: 26

## 2024-12-24 RX ORDER — FLUTICASONE PROPIONATE AND SALMETEROL 50; 500 UG/1; UG/1
1 POWDER ORAL; RESPIRATORY (INHALATION)
Refills: 0 | Status: DISCONTINUED | OUTPATIENT
Start: 2024-12-24 | End: 2024-12-26

## 2024-12-24 RX ORDER — ASPIRIN 81 MG
81 TABLET, DELAYED RELEASE (ENTERIC COATED) ORAL DAILY
Refills: 0 | Status: DISCONTINUED | OUTPATIENT
Start: 2024-12-24 | End: 2024-12-27

## 2024-12-24 RX ORDER — ALLOPURINOL 100 MG/1
200 TABLET ORAL DAILY
Refills: 0 | Status: DISCONTINUED | OUTPATIENT
Start: 2024-12-24 | End: 2024-12-27

## 2024-12-24 RX ORDER — MONTELUKAST SODIUM 10 MG/1
10 TABLET, FILM COATED ORAL AT BEDTIME
Refills: 0 | Status: DISCONTINUED | OUTPATIENT
Start: 2024-12-24 | End: 2024-12-27

## 2024-12-24 RX ORDER — TIOTROPIUM BROMIDE MONOHYDRATE 18 UG/1
2 CAPSULE ORAL; RESPIRATORY (INHALATION) DAILY
Refills: 0 | Status: DISCONTINUED | OUTPATIENT
Start: 2024-12-24 | End: 2024-12-24

## 2024-12-24 RX ADMIN — ENOXAPARIN SODIUM 40 MILLIGRAM(S): 60 INJECTION INTRAVENOUS; SUBCUTANEOUS at 05:50

## 2024-12-24 RX ADMIN — Medication 81 MILLIGRAM(S): at 11:15

## 2024-12-24 RX ADMIN — FLUTICASONE PROPIONATE AND SALMETEROL 1 DOSE(S): 50; 500 POWDER ORAL; RESPIRATORY (INHALATION) at 05:52

## 2024-12-24 RX ADMIN — IPRATROPIUM BROMIDE AND ALBUTEROL SULFATE 3 MILLILITER(S): .5; 2.5 SOLUTION RESPIRATORY (INHALATION) at 05:51

## 2024-12-24 RX ADMIN — IPRATROPIUM BROMIDE AND ALBUTEROL SULFATE 3 MILLILITER(S): .5; 2.5 SOLUTION RESPIRATORY (INHALATION) at 17:24

## 2024-12-24 RX ADMIN — Medication 5 MILLIGRAM(S): at 21:25

## 2024-12-24 RX ADMIN — ALLOPURINOL 200 MILLIGRAM(S): 100 TABLET ORAL at 11:14

## 2024-12-24 RX ADMIN — FLUTICASONE PROPIONATE AND SALMETEROL 1 DOSE(S): 50; 500 POWDER ORAL; RESPIRATORY (INHALATION) at 17:30

## 2024-12-24 RX ADMIN — METHYLPREDNISOLONE 40 MILLIGRAM(S): 4 TABLET ORAL at 05:50

## 2024-12-24 RX ADMIN — IPRATROPIUM BROMIDE AND ALBUTEROL SULFATE 3 MILLILITER(S): .5; 2.5 SOLUTION RESPIRATORY (INHALATION) at 11:15

## 2024-12-24 RX ADMIN — ENOXAPARIN SODIUM 40 MILLIGRAM(S): 60 INJECTION INTRAVENOUS; SUBCUTANEOUS at 17:23

## 2024-12-24 RX ADMIN — IPRATROPIUM BROMIDE AND ALBUTEROL SULFATE 3 MILLILITER(S): .5; 2.5 SOLUTION RESPIRATORY (INHALATION) at 01:08

## 2024-12-24 RX ADMIN — MONTELUKAST SODIUM 10 MILLIGRAM(S): 10 TABLET, FILM COATED ORAL at 21:25

## 2024-12-24 RX ADMIN — IPRATROPIUM BROMIDE AND ALBUTEROL SULFATE 3 MILLILITER(S): .5; 2.5 SOLUTION RESPIRATORY (INHALATION) at 23:50

## 2024-12-24 RX ADMIN — METHYLPREDNISOLONE 40 MILLIGRAM(S): 4 TABLET ORAL at 17:23

## 2024-12-24 NOTE — DISCHARGE NOTE PROVIDER - CARE PROVIDER_API CALL
Lew Wang.  [facility] Medicine  [facility]  [place] [address]  Phone: [phone]  Fax: [phone]  Follow Up Time: 1-3 days   Lew Wang  [facility] Medicine  [address]  [place] [address]  Phone: [phone]  Fax: [phone]  Follow Up Time: 1-3 days    Lavelle Jordan  Pulmonary Disease  [address] [place] [address]  Phone: [phone]  Fax: [phone]  [facility] Patient  Follow Up Time: 1-3 days   Lew Wang  Family Medicine  [address]  [place] [address]  Phone: [phone]  Fax: [phone]  Follow Up Time: 1-3 days    Lavelle Jordan  Pulmonary Disease  [address] [place] [address]  Phone: [phone]  Fax: [phone]  [facility] Patient  Follow Up Time: 1-3 days    Valdez Cano  Gastroenterology  [address] [place] [address]  Phone: [phone]  Fax: [phone]  Follow Up Time:

## 2024-12-24 NOTE — CONSULT NOTE ADULT - ASSESSMENT
59M w/Hx of COPD, HTN, Gout presents due to SOB and generalized malaise. Patient reports he was in his normal state of health until this morning where he felt SOB walking at work. He denies cough, congestion, wheezing. Has not smoked in 15 years. Patient used his levalbuterol inhaler this morning, but did not help him. On my exam, he reports his condition has improved since arrival to ED. He is breathing comfortably on 2L NC. Patient denies fever, chills, headache, dizziness, abd pain, nausea, vomiting, constipation, dysuria. Denies sick contacts.    59M w/Hx of COPD, HTN, Gout presents due to SOB and generalized malaise. Patient reports he was in his normal state of health until this morning where he felt SOB walking at work. He denies cough, congestion, wheezing. Has not smoked in 15 years. Patient used his levalbuterol inhaler this morning, but did not help him. On my exam, he reports his condition has improved since arrival to ED. He is breathing comfortably on 2L NC. Patient denies fever, chills, headache, dizziness, abd pain, nausea, vomiting, constipation, dysuria. Denies sick contacts.   pt with known hx of copd and multiple risk factor for cad with c/o new onset of MURRAY and abnormal ecg  r/o coronary insufficiency  tele  asa  daily  check ecg in am  cath

## 2024-12-24 NOTE — CONSULT NOTE ADULT - SUBJECTIVE AND OBJECTIVE BOX
Date of Service, 12-24-24 @ 18:54  CHIEF COMPLAINT:Patient is a 59y old  Male who presents with a chief complaint of COPD Exacerbation (24 Dec 2024 15:07)      HPI:  59M w/Hx of COPD, HTN, Gout presents due to SOB and generalized malaise. Patient reports he was in his normal state of health until this morning where he felt SOB walking at work. He denies cough, congestion, wheezing. Has not smoked in 15 years. Patient used his levalbuterol inhaler this morning, but did not help him. On my exam, he reports his condition has improved since arrival to ED. He is breathing comfortably on 2L NC. Patient denies fever, chills, headache, dizziness, abd pain, nausea, vomiting, constipation, dysuria. Denies sick contacts.       PAST MEDICAL & SURGICAL HISTORY:  COPD (chronic obstructive pulmonary disease)      Prostate cancer      H/O umbilical hernia repair      Mild persistent asthma      H/O prostatectomy      H/O umbilical hernia repair          MEDICATIONS  (STANDING):  albuterol/ipratropium for Nebulization 3 milliLiter(s) Nebulizer every 6 hours  albuterol/ipratropium for Nebulization.. 3 milliLiter(s) Nebulizer every 20 minutes  allopurinol 200 milliGRAM(s) Oral daily  amLODIPine   Tablet 5 milliGRAM(s) Oral at bedtime  aspirin enteric coated 81 milliGRAM(s) Oral daily  enoxaparin Injectable 40 milliGRAM(s) SubCutaneous every 12 hours  fluticasone propionate/ salmeterol 100-50 MICROgram(s) Diskus 1 Dose(s) Inhalation two times a day  methylPREDNISolone sodium succinate Injectable 40 milliGRAM(s) IV Push every 12 hours  montelukast 10 milliGRAM(s) Oral at bedtime    MEDICATIONS  (PRN):  acetaminophen     Tablet .. 650 milliGRAM(s) Oral every 6 hours PRN Temp greater or equal to 38C (100.4F), Mild Pain (1 - 3)  melatonin 3 milliGRAM(s) Oral at bedtime PRN Insomnia      FAMILY HISTORY:      SOCIAL HISTORY:    [ ] Non-smoker  [ ex] Smoker  [ ] Alcohol    Allergies    No Known Drug Allergies  shellfish (Anaphylaxis; Rash)    Intolerances    	    REVIEW OF SYSTEMS:  CONSTITUTIONAL: No fever, weight loss, or fatigue  EYES: No eye pain, visual disturbances, or discharge  ENT:  No difficulty hearing, tinnitus, vertigo; No sinus or throat pain  NECK: No pain or stiffness  RESPIRATORY: No cough, wheezing, chills or hemoptysis; No Shortness of Breath  CARDIOVASCULAR: No chest pain, palpitations, passing out, dizziness, or leg swelling  GASTROINTESTINAL: No abdominal or epigastric pain. No nausea, vomiting, or hematemesis; No diarrhea or constipation. No melena or hematochezia.  GENITOURINARY: No dysuria, frequency, hematuria, or incontinence  NEUROLOGICAL: No headaches, memory loss, loss of strength, numbness, or tremors  SKIN: No itching, burning, rashes, or lesions   LYMPH Nodes: No enlarged glands  ENDOCRINE: No heat or cold intolerance; No hair loss  MUSCULOSKELETAL: No joint pain or swelling; No muscle, back, or extremity pain  PSYCHIATRIC: No depression, anxiety, mood swings, or difficulty sleeping  HEME/LYMPH: No easy bruising, or bleeding gums  ALLERGY AND IMMUNOLOGIC: No hives or eczema	    [ ] All others negative	  [ ] Unable to obtain    PHYSICAL EXAM:  T(C): 36.6 (12-24-24 @ 16:52), Max: 36.9 (12-23-24 @ 19:40)  HR: 110 (12-24-24 @ 16:52) (87 - 110)  BP: 146/81 (12-24-24 @ 16:52) (108/77 - 157/76)  RR: 18 (12-24-24 @ 16:52) (18 - 19)  SpO2: 94% (12-24-24 @ 16:52) (92% - 96%)  Wt(kg): --  I&O's Summary    23 Dec 2024 07:01  -  24 Dec 2024 07:00  --------------------------------------------------------  IN: 220 mL / OUT: 0 mL / NET: 220 mL        Appearance: Normal	  HEENT:   Normal oral mucosa, PERRL, EOMI	  Lymphatic: No lymphadenopathy  Cardiovascular: Normal S1 S2, No JVD, No murmurs, No edema  Respiratory: Lungs clear to auscultation	  Psychiatry: A & O x 3, Mood & affect appropriate  Gastrointestinal:  Soft, Non-tender, + BS	  Skin: No rashes, No ecchymoses, No cyanosis	  Neurologic: Non-focal  Extremities: Normal range of motion, No clubbing, cyanosis or edema  Vascular: Peripheral pulses palpable 2+ bilaterally    TELEMETRY: 	    ECG:  	  RADIOLOGY:  OTHER: 	  	  LABS:	 	    CARDIAC MARKERS:                              15.5   8.59  )-----------( 253      ( 24 Dec 2024 06:32 )             45.3     12-24    138  |  103  |  15  ----------------------------<  165[H]  3.9   |  22  |  0.72    Ca    9.3      24 Dec 2024 06:32    TPro  6.6  /  Alb  4.2  /  TBili  0.4  /  DBili  x   /  AST  40  /  ALT  59[H]  /  AlkPhos  58  12-24    proBNP:   Lipid Profile: Cholesterol 167  LDL --  HDL 41  TG 75    HgA1c:   TSH:   PT/INR - ( 23 Dec 2024 15:16 )   PT: 12.6 sec;   INR: 1.10 ratio         PTT - ( 23 Dec 2024 15:16 )  PTT:28.7 sec    PREVIOUS DIAGNOSTIC TESTING:      < from: Xray Chest 1 View-PORTABLE IMMEDIATE (Xray Chest 1 View-PORTABLE IMMEDIATE .) (12.23.24 @ 15:52) >  The heart is normal in size.  The lungs are clear.  There is no pneumothorax or pleural effusion.  No acute osseous abnormalities.    IMPRESSION:  Clear lungs.    < from: CT Abdomen and Pelvis w/ Oral Cont and w/ IV Cont (05.08.24 @ 09:24) >  LIVER: Steatosis.  BILE DUCTS: Normal caliber.  GALLBLADDER: Within normal limits.  SPLEEN: Within normal limits.  PANCREAS: Within normal limits.  ADRENALS: Within normal limits.  KIDNEYS/URETERS: Within normal limits.    BLADDER: Within normal limits.  REPRODUCTIVE ORGANS: Prostatectomy.    BOWEL: No bowel obstruction. Appendixis normal.  PERITONEUM: No ascites.  VESSELS: Within normal limits.  RETROPERITONEUM/LYMPH NODES: No lymphadenopathy.  ABDOMINAL WALL: Rectus diastases.  Small fat-containing right   paraumbilical hernia, hernia sac measures up to 3.0 cm, hernia neck  measures 0.9 cm. Small bilateral fat-containing inguinal hernias.  BONES: Degenerative changes.    IMPRESSION:  Small fat-containing right paraumbilical hernia.  Small bilateral fat-containing inguinal hernias.

## 2024-12-24 NOTE — DISCHARGE NOTE PROVIDER - CARE PROVIDERS DIRECT ADDRESSES
[email or web address]

## 2024-12-24 NOTE — DISCHARGE NOTE PROVIDER - NSDCCPCAREPLAN_GEN_ALL_CORE_FT
PRINCIPAL DISCHARGE DIAGNOSIS  Diagnosis: COPD exacerbation  Assessment and Plan of Treatment: Call your Health Care provider upon arrival home to make a follow up appointment within one week.  Take all inhalers as prescribed by your Health Care Provider.  Take steroids as prescribed by your Health Care Provider.  If your cough increases infrequency and severity and/or you have shortness of breath or increased shortness of breath call your Health Care Provider.  If you develop fever, chills, night sweats, malaise, and/or change in mental status call your Health care Provider.  Nutrition is very important.  Eat small frequent meals.  Increase your activity as tolerated.  Do not stay in bed all day        SECONDARY DISCHARGE DIAGNOSES  Diagnosis: HTN (hypertension)  Assessment and Plan of Treatment: Follow up with your medical doctor to establish long term blood pressure treatment goals.      Diagnosis: Elevated transaminase level  Assessment and Plan of Treatment: improved and Ultrasound show FINDINGS:  Liver: Increased echogenicity, consistent with steatosis, with sparing   along the gallbladder fossa.  Bile ducts: Normal caliber. Common bile duct measures 4 mm.  Gallbladder: Within normal limits.  Pancreas: Visualized portions are within normal limits.  Right kidney: 12.9 cm. No hydronephrosis.  Ascites: None.  IVC: Visualized portions are within normal limits.  IMPRESSION:  *  Hepatic steatosis.  *  No evidence of biliary ductal dilatation.  Outpatient follow up with PMD    Diagnosis: Acute hypoxic respiratory failure  Assessment and Plan of Treatment: resolved     PRINCIPAL DISCHARGE DIAGNOSIS  Diagnosis: COPD exacerbation  Assessment and Plan of Treatment: Call your Health Care provider upon arrival home to make a follow up appointment within one week.  Take all inhalers as prescribed by your Health Care Provider.  Take steroids as prescribed by your Health Care Provider.  If your cough increases infrequency and severity and/or you have shortness of breath or increased shortness of breath call your Health Care Provider.  If you develop fever, chills, night sweats, malaise, and/or change in mental status call your Health care Provider.  Nutrition is very important.  Eat small frequent meals.  Increase your activity as tolerated.  Do not stay in bed all day        SECONDARY DISCHARGE DIAGNOSES  Diagnosis: Acute hypoxic respiratory failure  Assessment and Plan of Treatment: krish mckeon 2/2 COPD exacerbation    Diagnosis: Elevated transaminase level  Assessment and Plan of Treatment: Ultrasound show FINDINGS:  Liver: Increased echogenicity, consistent with steatosis, with sparing   along the gallbladder fossa.  Bile ducts: Normal caliber. Common bile duct measures 4 mm.  Gallbladder: Within normal limits.  Pancreas: Visualized portions are within normal limits.  Right kidney: 12.9 cm. No hydronephrosis.  Ascites: None.  IVC: Visualized portions are within normal limits.  IMPRESSION:  *  Hepatic steatosis.  *  No evidence of biliary ductal dilatation.  Outpatient follow up with PMD  Please have outpt PMD re- check youe liver function test in 1  week    Diagnosis: HTN (hypertension)  Assessment and Plan of Treatment: Follow up with your medical doctor to establish long term blood pressure treatment goals.

## 2024-12-24 NOTE — DISCHARGE NOTE PROVIDER - HOSPITAL COURSE
HPI:  59M w/Hx of COPD, HTN, Gout presents due to SOB and generalized malaise. Patient reports he was in his normal state of health until this morning where he felt SOB walking at work. He denies cough, congestion, wheezing. Has not smoked in 15 years. Patient used his levalbuterol inhaler this morning, but did not help him. On my exam, he reports his condition has improved since arrival to ED. He is breathing comfortably on 2L NC. Patient denies fever, chills, headache, dizziness, abd pain, nausea, vomiting, constipation, dysuria. Denies sick contacts. (23 Dec 2024 22:10)    Hospital Course: Patient admitted for COPD exacerbation , treated with intravneous steroids now symptoms have improved , no longer requiring oxygen . Of note patient was with elevated liver function test , had ultrasound of abdomen and show FINDINGS:  Liver: Increased echogenicity, consistent with steatosis, with sparing   along the gallbladder fossa.  Bile ducts: Normal caliber. Common bile duct measures 4 mm.  Gallbladder: Within normal limits.  Pancreas: Visualized portions are within normal limits.  Right kidney: 12.9 cm. No hydronephrosis.  Ascites: None.  IVC: Visualized portions are within normal limits.    IMPRESSION:  *  Hepatic steatosis.  *  No evidence of biliary ductal dilatation.    Patient is now medically cleared for discharge and outpatient follow up with PCP .      Important Medication Changes and Reason:    Active or Pending Issues Requiring Follow-up: monitor liver function test     Advanced Directives:   [ x] Full code  [ ] DNR  [ ] Hospice    Discharge Diagnoses:  COPD exacerbation   Elevated liver enzymes        HPI:  59M w/Hx of COPD, HTN, Gout presents due to SOB and generalized malaise. Patient reports he was in his normal state of health until this morning where he felt SOB walking at work. He denies cough, congestion, wheezing. Has not smoked in 15 years. Patient used his levalbuterol inhaler this morning, but did not help him. On my exam, he reports his condition has improved since arrival to ED. He is breathing comfortably on 2L NC. Patient denies fever, chills, headache, dizziness, abd pain, nausea, vomiting, constipation, dysuria. Denies sick contacts. (23 Dec 2024 22:10)    Hospital Course: Patient admitted for COPD exacerbation , treated with intravneous steroids now symptoms have improved , no longer requiring oxygen . Of note patient was with elevated liver function test , had ultrasound of abdomen and show FINDINGS:  Liver: Increased echogenicity, consistent with steatosis, with sparing   along the gallbladder fossa.  Bile ducts: Normal caliber. Common bile duct measures 4 mm.  Gallbladder: Within normal limits.  Pancreas: Visualized portions are within normal limits.  Right kidney: 12.9 cm. No hydronephrosis.  Ascites: None.  IVC: Visualized portions are within normal limits.    IMPRESSION:  *  Hepatic steatosis.  *  No evidence of biliary ductal dilatation.  > Patient had an  EKG done and show t waves inversion in inferior leads , cardiology contacted Dr Espitia and recommended ................................................................    Patient is now medically cleared for discharge and outpatient follow up with PCP .      Important Medication Changes and Reason:    Active or Pending Issues Requiring Follow-up: monitor liver function test     Advanced Directives:   [ x] Full code  [ ] DNR  [ ] Hospice    Discharge Diagnoses:  COPD exacerbation   Elevated liver enzymes        HPI:  59M w/Hx of COPD, HTN, Gout presents due to SOB and generalized malaise. Patient reports he was in his normal state of health until this morning where he felt SOB walking at work. He denies cough, congestion, wheezing. Has not smoked in 15 years. Patient used his levalbuterol inhaler this morning, but did not help him. On my exam, he reports his condition has improved since arrival to ED. He is breathing comfortably on 2L NC. Patient denies fever, chills, headache, dizziness, abd pain, nausea, vomiting, constipation, dysuria. Denies sick contacts. (23 Dec 2024 22:10)    Hospital Course: Patient admitted for COPD exacerbation and elevated LFTs. Pulmonary consulted, CXR with clear lungs.  Patient s/p intravenous steroids now symptoms have improved , and  no longer requiring oxygen . Of note patient was with elevated liver function test. Gastroenterology consulted, clinically asymptomatic w/negative hepatitis panel   US w/Hepatic Steatosis, favor this is cause of his increase in LFT's EBV serologies pending. Patient encouraged to lose  weight and exercise   avoid unnecessary hepatotoxins .  Patient had an  EKG done and show t waves inversion in inferior leads , cardiology contacted Dr Espitia and recommended cardiac cath and outpatient ECHO. Patient S/p cardiac cath 12/26, found to have mild to moderate nonobstructive CAD. Management per cardiology.    Patient is now medically cleared for discharge and outpatient follow up with PCP .      Important Medication Changes and Reason:    Active or Pending Issues Requiring Follow-up: monitor liver function test ; Outpatient ECHO    Advanced Directives:   [ x] Full code  [ ] DNR  [ ] Hospice    Discharge Diagnoses:  COPD exacerbation   Elevated liver enzymes        HPI:  59M w/Hx of COPD, HTN, Gout presents due to SOB and generalized malaise. Patient reports he was in his normal state of health until this morning where he felt SOB walking at work. He denies cough, congestion, wheezing. Has not smoked in 15 years. Patient used his levalbuterol inhaler this morning, but did not help him. On my exam, he reports his condition has improved since arrival to ED. He is breathing comfortably on 2L NC. Patient denies fever, chills, headache, dizziness, abd pain, nausea, vomiting, constipation, dysuria. Denies sick contacts. (23 Dec 2024 22:10)    Hospital Course: Patient admitted for COPD exacerbation and elevated LFTs. Pulmonary consulted, CXR with clear lungs.  Patient s/p intravenous steroids now symptoms have improved , and  no longer requiring oxygen . Of note patient was with elevated liver function test. Gastroenterology consulted, clinically asymptomatic w/negative hepatitis panel   US w/Hepatic Steatosis, favor this is cause of his increase in LFT's EBV serologies pending. Patient encouraged to lose  weight and exercise   avoid unnecessary hepatotoxins .  Patient had an  EKG done and show t waves inversion in inferior leads , cardiology contacted Dr Quiroz and recommended cardiac cath and outpatient ECHO. Patient S/p cardiac cath 12/26, found to have mild to moderate nonobstructive CAD. Management per cardiology.    Patient is now medically cleared for discharge and outpatient follow up with PCP .      Important Medication Changes and Reason: metoprolol     Active or Pending Issues Requiring Follow-up: monitor liver function test ; Outpatient ECHO    Advanced Directives:   [ x] Full code  [ ] DNR  [ ] Hospice    Discharge Diagnoses:  COPD exacerbation   Elevated liver enzymes

## 2024-12-24 NOTE — DISCHARGE NOTE NURSING/CASE MANAGEMENT/SOCIAL WORK - PATIENT PORTAL LINK FT
You can access the FollowMyHealth Patient Portal offered by [facility] by registering at the following website: [email or web address]. By joining [email or web address] FollowMyHealth portal, you will also be able to view your health information using other applications (apps) compatible with our system.

## 2024-12-24 NOTE — DISCHARGE NOTE PROVIDER - PROVIDER RX CONTACT NUMBER
Problem: Rehabilitation (IRF) Plan of Care  Goal: Plan of Care Review  Outcome: Ongoing, Progressing  Flowsheets (Taken 7/29/2023 1835)  Progress: improving  Plan of Care Reviewed With: patient  Outcome Evaluation:   Patient resting well. No acute signs of distress noted. Call light and items within reach. Continue current plan of care   monitor.  Goal: Patient-Specific Goal (Individualized)  Outcome: Ongoing, Progressing  Goal: Absence of New-Onset Illness or Injury  Outcome: Ongoing, Progressing  Intervention: Prevent Fall and Fall Injury  Recent Flowsheet Documentation  Taken 7/29/2023 1800 by Karyn Hauser RN  Safety Promotion/Fall Prevention:   safety round/check completed   nonskid shoes/slippers when out of bed   assistive device/personal items within reach  Taken 7/29/2023 1600 by Karyn Hauser RN  Safety Promotion/Fall Prevention:   safety round/check completed   nonskid shoes/slippers when out of bed   assistive device/personal items within reach  Taken 7/29/2023 1400 by Karyn Hauser RN  Safety Promotion/Fall Prevention:   safety round/check completed   nonskid shoes/slippers when out of bed   assistive device/personal items within reach  Taken 7/29/2023 1200 by Karyn Hauser RN  Safety Promotion/Fall Prevention:   safety round/check completed   nonskid shoes/slippers when out of bed   assistive device/personal items within reach  Taken 7/29/2023 1000 by Karyn Hauser RN  Safety Promotion/Fall Prevention:   safety round/check completed   nonskid shoes/slippers when out of bed   assistive device/personal items within reach  Taken 7/29/2023 0800 by Karyn Hauser RN  Safety Promotion/Fall Prevention:   safety round/check completed   nonskid shoes/slippers when out of bed   assistive device/personal items within reach  Intervention: Prevent Infection  Recent Flowsheet Documentation  Taken 7/29/2023 1800 by Karyn Hauser RN  Infection Prevention:   hand hygiene promoted    rest/sleep promoted  Taken 7/29/2023 1600 by Karyn Hauser RN  Infection Prevention:   hand hygiene promoted   rest/sleep promoted  Taken 7/29/2023 1400 by Karyn Hauser RN  Infection Prevention:   hand hygiene promoted   rest/sleep promoted  Taken 7/29/2023 1200 by Karyn Hauser RN  Infection Prevention:   hand hygiene promoted   rest/sleep promoted  Taken 7/29/2023 1000 by Karyn Hauser RN  Infection Prevention:   hand hygiene promoted   rest/sleep promoted  Taken 7/29/2023 0800 by Karyn Hauser RN  Infection Prevention: hand hygiene promoted  Goal: Optimal Comfort and Wellbeing  Outcome: Ongoing, Progressing  Goal: Home and Community Transition Plan Established  Outcome: Ongoing, Progressing   Goal Outcome Evaluation:  Plan of Care Reviewed With: patient        Progress: improving  Outcome Evaluation: Patient resting well. No acute signs of distress noted. Call light and items within reach. Continue current plan of care; monitor.          [phone]

## 2024-12-24 NOTE — PROGRESS NOTE ADULT - SUBJECTIVE AND OBJECTIVE BOX
Date of service: 12-24-24 @ 15:07      Patient is a 59y old  Male who presents with a chief complaint of COPD Exacerbation (24 Dec 2024 11:29)                                                               INTERVAL HPI/OVERNIGHT EVENTS:    REVIEW OF SYSTEMS:    no cp   no sob   no n/V  no abd pain   no N/V                                                                                                                                                                                                                                                                           Medications:  MEDICATIONS  (STANDING):  albuterol/ipratropium for Nebulization 3 milliLiter(s) Nebulizer every 6 hours  albuterol/ipratropium for Nebulization.. 3 milliLiter(s) Nebulizer every 20 minutes  allopurinol 200 milliGRAM(s) Oral daily  amLODIPine   Tablet 5 milliGRAM(s) Oral at bedtime  aspirin enteric coated 81 milliGRAM(s) Oral daily  enoxaparin Injectable 40 milliGRAM(s) SubCutaneous every 12 hours  fluticasone propionate/ salmeterol 100-50 MICROgram(s) Diskus 1 Dose(s) Inhalation two times a day  methylPREDNISolone sodium succinate Injectable 40 milliGRAM(s) IV Push every 12 hours  montelukast 10 milliGRAM(s) Oral at bedtime    MEDICATIONS  (PRN):  acetaminophen     Tablet .. 650 milliGRAM(s) Oral every 6 hours PRN Temp greater or equal to 38C (100.4F), Mild Pain (1 - 3)  melatonin 3 milliGRAM(s) Oral at bedtime PRN Insomnia       Allergies    No Known Drug Allergies  shellfish (Anaphylaxis; Rash)    Intolerances      Vital Signs Last 24 Hrs  T(C): 36.8 (24 Dec 2024 13:57), Max: 36.9 (23 Dec 2024 16:42)  T(F): 98.3 (24 Dec 2024 13:57), Max: 98.5 (23 Dec 2024 16:42)  HR: 108 (24 Dec 2024 13:57) (87 - 111)  BP: 118/74 (24 Dec 2024 13:57) (107/88 - 157/76)  BP(mean): --  RR: 18 (24 Dec 2024 13:57) (18 - 19)  SpO2: 92% (24 Dec 2024 13:57) (91% - 96%)    Parameters below as of 24 Dec 2024 13:57  Patient On (Oxygen Delivery Method): room air      CAPILLARY BLOOD GLUCOSE          12-23 @ 07:01  -  12-24 @ 07:00  --------------------------------------------------------  IN: 220 mL / OUT: 0 mL / NET: 220 mL      Physical Exam:    Daily     Daily   General:  Well appearing, NAD, not cachetic  HEENT:  Nonicteric, PERRLA  CV:  RRR, S1S2   Lungs:  poor airentry otherwise CTA  Abdomen:  Soft, non-tender, no distended, positive BS  Extremities: no edema                                                                                                                                                                                                                                                                                LABS:                               15.5   8.59  )-----------( 253      ( 24 Dec 2024 06:32 )             45.3                      12-24    138  |  103  |  15  ----------------------------<  165[H]  3.9   |  22  |  0.72    Ca    9.3      24 Dec 2024 06:32    TPro  6.6  /  Alb  4.2  /  TBili  0.4  /  DBili  x   /  AST  40  /  ALT  59[H]  /  AlkPhos  58  12-24                       RADIOLOGY & ADDITIONAL TESTS         I personally reviewed: [  ]EKG   [  ]CXR    [  ] CT      A/P:         Discussed with :     Marilou consultants' Notes   Time spent :

## 2024-12-24 NOTE — DISCHARGE NOTE NURSING/CASE MANAGEMENT/SOCIAL WORK - FINANCIAL ASSISTANCE
[facility] provides services at a reduced cost to those who are determined to be eligible through [facility]’s financial assistance program. Information regarding [facility]’s financial assistance program can be found by going to [email or web address] or by calling [phone].

## 2024-12-24 NOTE — DISCHARGE NOTE PROVIDER - PROVIDER TOKENS
PROVIDER:[TOKEN:[[id number]],FOLLOWUP:[1-3 days]] PROVIDER:[TOKEN:[[id number]],FOLLOWUP:[1-3 days]],PROVIDER:[TOKEN:[[id number]],FOLLOWUP:[1-3 days],ESTABLISHEDPATIENT:[T]] PROVIDER:[TOKEN:[[id number]],FOLLOWUP:[1-3 days]],PROVIDER:[TOKEN:[[id number]],FOLLOWUP:[1-3 days],ESTABLISHEDPATIENT:[T]],PROVIDER:[TOKEN:[[id number]]]

## 2024-12-24 NOTE — DISCHARGE NOTE PROVIDER - NSDCMRMEDTOKEN_GEN_ALL_CORE_FT
allopurinol 100 mg oral tablet: 2 orally once a day  amLODIPine 5 mg oral tablet: 1 tab(s) orally once a day (at bedtime)  Anoro Ellipta 62.5 mcg-25 mcg/inh inhalation powder: 1 puff(s) inhaled once a day  aspirin 81 mg oral tablet: orally once a day  montelukast: 10 milligram(s) orally once a day (at bedtime)  Xopenex HFA 45 mcg/inh inhalation aerosol: 2 puff(s) inhaled every 6 hours as needed for  shortness of breath and/or wheezing   allopurinol 100 mg oral tablet: 2 orally once a day  amLODIPine 5 mg oral tablet: 1 tab(s) orally once a day (at bedtime)  Anoro Ellipta 62.5 mcg-25 mcg/inh inhalation powder: 1 puff(s) inhaled once a day  aspirin 81 mg oral tablet: orally once a day  metoprolol succinate 25 mg oral tablet, extended release: 1 tab(s) orally once a day  montelukast: 10 milligram(s) orally once a day (at bedtime)  Xopenex HFA 45 mcg/inh inhalation aerosol: 2 puff(s) inhaled every 6 hours as needed for  shortness of breath and/or wheezing

## 2024-12-25 LAB
CHOLEST SERPL-MCNC: 162 MG/DL — SIGNIFICANT CHANGE UP
HDLC SERPL-MCNC: 36 MG/DL — LOW
LIPID PNL WITH DIRECT LDL SERPL: 96 MG/DL — SIGNIFICANT CHANGE UP
NON HDL CHOLESTEROL: 126 MG/DL — SIGNIFICANT CHANGE UP
TRIGL SERPL-MCNC: 169 MG/DL — HIGH

## 2024-12-25 RX ORDER — PREDNISONE 5 MG
40 TABLET ORAL DAILY
Refills: 0 | Status: DISCONTINUED | OUTPATIENT
Start: 2024-12-25 | End: 2024-12-26

## 2024-12-25 RX ORDER — METOPROLOL TARTRATE 50 MG
25 TABLET ORAL DAILY
Refills: 0 | Status: DISCONTINUED | OUTPATIENT
Start: 2024-12-25 | End: 2024-12-27

## 2024-12-25 RX ADMIN — IPRATROPIUM BROMIDE AND ALBUTEROL SULFATE 3 MILLILITER(S): .5; 2.5 SOLUTION RESPIRATORY (INHALATION) at 17:12

## 2024-12-25 RX ADMIN — ENOXAPARIN SODIUM 40 MILLIGRAM(S): 60 INJECTION INTRAVENOUS; SUBCUTANEOUS at 17:10

## 2024-12-25 RX ADMIN — METHYLPREDNISOLONE 40 MILLIGRAM(S): 4 TABLET ORAL at 05:52

## 2024-12-25 RX ADMIN — MONTELUKAST SODIUM 10 MILLIGRAM(S): 10 TABLET, FILM COATED ORAL at 21:43

## 2024-12-25 RX ADMIN — FLUTICASONE PROPIONATE AND SALMETEROL 1 DOSE(S): 50; 500 POWDER ORAL; RESPIRATORY (INHALATION) at 17:12

## 2024-12-25 RX ADMIN — Medication 81 MILLIGRAM(S): at 12:51

## 2024-12-25 RX ADMIN — IPRATROPIUM BROMIDE AND ALBUTEROL SULFATE 3 MILLILITER(S): .5; 2.5 SOLUTION RESPIRATORY (INHALATION) at 05:49

## 2024-12-25 RX ADMIN — ALLOPURINOL 200 MILLIGRAM(S): 100 TABLET ORAL at 12:50

## 2024-12-25 RX ADMIN — Medication 5 MILLIGRAM(S): at 21:44

## 2024-12-25 RX ADMIN — ENOXAPARIN SODIUM 40 MILLIGRAM(S): 60 INJECTION INTRAVENOUS; SUBCUTANEOUS at 05:52

## 2024-12-25 RX ADMIN — FLUTICASONE PROPIONATE AND SALMETEROL 1 DOSE(S): 50; 500 POWDER ORAL; RESPIRATORY (INHALATION) at 05:49

## 2024-12-25 RX ADMIN — IPRATROPIUM BROMIDE AND ALBUTEROL SULFATE 3 MILLILITER(S): .5; 2.5 SOLUTION RESPIRATORY (INHALATION) at 12:51

## 2024-12-25 NOTE — PROGRESS NOTE ADULT - SUBJECTIVE AND OBJECTIVE BOX
Date of service: 12-25-24 @ 23:34      Patient is a 59y old  Male who presents with a chief complaint of COPD Exacerbation (25 Dec 2024 10:37)                                                               INTERVAL HPI/OVERNIGHT EVENTS:    REVIEW OF SYSTEMS:     CONSTITUTIONAL: No weakness, fevers or chills  EYES/ENT: No visual changes , no ear ache   NECK: No pain or stiffness  RESPIRATORY: No cough, wheezing,  No shortness of breath  CARDIOVASCULAR: No chest pain or palpitations  GASTROINTESTINAL: No abdominal pain  . No nausea, vomiting, or hematemesis; No diarrhea or constipation. No melena or hematochezia.  GENITOURINARY: No dysuria, frequency or hematuria  NEUROLOGICAL: No numbness or weakness  SKIN: No itching, burning, rashes, or lesions                                                                                                                                                                                                                                                                                 Medications:  MEDICATIONS  (STANDING):  albuterol/ipratropium for Nebulization 3 milliLiter(s) Nebulizer every 6 hours  albuterol/ipratropium for Nebulization.. 3 milliLiter(s) Nebulizer every 20 minutes  allopurinol 200 milliGRAM(s) Oral daily  amLODIPine   Tablet 5 milliGRAM(s) Oral at bedtime  aspirin enteric coated 81 milliGRAM(s) Oral daily  enoxaparin Injectable 40 milliGRAM(s) SubCutaneous every 12 hours  fluticasone propionate/ salmeterol 100-50 MICROgram(s) Diskus 1 Dose(s) Inhalation two times a day  metoprolol succinate ER 25 milliGRAM(s) Oral daily  montelukast 10 milliGRAM(s) Oral at bedtime  predniSONE   Tablet 40 milliGRAM(s) Oral daily    MEDICATIONS  (PRN):  acetaminophen     Tablet .. 650 milliGRAM(s) Oral every 6 hours PRN Temp greater or equal to 38C (100.4F), Mild Pain (1 - 3)  melatonin 3 milliGRAM(s) Oral at bedtime PRN Insomnia       Allergies    No Known Drug Allergies  shellfish (Anaphylaxis; Rash)    Intolerances      Vital Signs Last 24 Hrs  T(C): 36.4 (25 Dec 2024 21:32), Max: 36.8 (25 Dec 2024 17:14)  T(F): 97.5 (25 Dec 2024 21:32), Max: 98.3 (25 Dec 2024 17:14)  HR: 72 (25 Dec 2024 21:32) (72 - 110)  BP: 156/73 (25 Dec 2024 21:32) (118/80 - 156/73)  BP(mean): --  RR: 18 (25 Dec 2024 21:32) (18 - 18)  SpO2: 95% (25 Dec 2024 21:32) (93% - 95%)    Parameters below as of 25 Dec 2024 21:32  Patient On (Oxygen Delivery Method): room air      CAPILLARY BLOOD GLUCOSE          12-24 @ 07:01  -  12-25 @ 07:00  --------------------------------------------------------  IN: 240 mL / OUT: 0 mL / NET: 240 mL    12-25 @ 07:01  -  12-25 @ 23:34  --------------------------------------------------------  IN: 840 mL / OUT: 0 mL / NET: 840 mL      Physical Exam:    Daily     Daily   General:  Well appearing, NAD, not cachetic  HEENT:  Nonicteric, PERRLA  CV:  RRR, S1S2   Lungs:  CTA B/L, no wheezes, rales, rhonchi  Abdomen:  Soft, non-tender, no distended, positive BS  Extremities:  2+ pulses, no c/c, no edema  Skin:  Warm and dry, no rashes  :  No mccord  Neuro:  AAOx3, non-focal, grossly intact                                                                                                                                                                                                                                                                                                LABS:                               15.5   8.59  )-----------( 253      ( 24 Dec 2024 06:32 )             45.3                      12-24    138  |  103  |  15  ----------------------------<  165[H]  3.9   |  22  |  0.72    Ca    9.3      24 Dec 2024 06:32    TPro  6.6  /  Alb  4.2  /  TBili  0.4  /  DBili  x   /  AST  40  /  ALT  59[H]  /  AlkPhos  58  12-24                       RADIOLOGY & ADDITIONAL TESTS         I personally reviewed: [  ]EKG   [  ]CXR    [  ] CT      A/P:         Discussed with :     [facility]il[facility] consultants' Notes   Time spent :   Date of service: 12-25-24 @ 23:34      Patient is a 59y old  Male who presents with a chief complaint of COPD Exacerbation (25 Dec 2024 10:37)                                                               INTERVAL HPI/OVERNIGHT EVENTS:    REVIEW OF SYSTEMS:     CONSTITUTIONAL: No weakness, fevers or chills  EYES/ENT: No visual changes , no ear ache   NECK: No pain or stiffness  RESPIRATORY: No cough, wheezing,  No shortness of breath  CARDIOVASCULAR: No chest pain or palpitations  GASTROINTESTINAL: No abdominal pain  . No nausea, vomiting, or hematemesis; No diarrhea or constipation. No melena or hematochezia.  GENITOURINARY: No dysuria, frequency or hematuria  NEUROLOGICAL: No numbness or weakness                                                                                                                                                                                                                                                                             Medications:  MEDICATIONS  (STANDING):  albuterol/ipratropium for Nebulization 3 milliLiter(s) Nebulizer every 6 hours  albuterol/ipratropium for Nebulization.. 3 milliLiter(s) Nebulizer every 20 minutes  allopurinol 200 milliGRAM(s) Oral daily  amLODIPine   Tablet 5 milliGRAM(s) Oral at bedtime  aspirin enteric coated 81 milliGRAM(s) Oral daily  enoxaparin Injectable 40 milliGRAM(s) SubCutaneous every 12 hours  fluticasone propionate/ salmeterol 100-50 MICROgram(s) Diskus 1 Dose(s) Inhalation two times a day  metoprolol succinate ER 25 milliGRAM(s) Oral daily  montelukast 10 milliGRAM(s) Oral at bedtime  predniSONE   Tablet 40 milliGRAM(s) Oral daily    MEDICATIONS  (PRN):  acetaminophen     Tablet .. 650 milliGRAM(s) Oral every 6 hours PRN Temp greater or equal to 38C (100.4F), Mild Pain (1 - 3)  melatonin 3 milliGRAM(s) Oral at bedtime PRN Insomnia       Allergies    No Known Drug Allergies  shellfish (Anaphylaxis; Rash)    Intolerances      Vital Signs Last 24 Hrs  T(C): 36.4 (25 Dec 2024 21:32), Max: 36.8 (25 Dec 2024 17:14)  T(F): 97.5 (25 Dec 2024 21:32), Max: 98.3 (25 Dec 2024 17:14)  HR: 72 (25 Dec 2024 21:32) (72 - 110)  BP: 156/73 (25 Dec 2024 21:32) (118/80 - 156/73)  BP(mean): --  RR: 18 (25 Dec 2024 21:32) (18 - 18)  SpO2: 95% (25 Dec 2024 21:32) (93% - 95%)    Parameters below as of 25 Dec 2024 21:32  Patient On (Oxygen Delivery Method): room air      CAPILLARY BLOOD GLUCOSE          12-24 @ 07:01  -  12-25 @ 07:00  --------------------------------------------------------  IN: 240 mL / OUT: 0 mL / NET: 240 mL    12-25 @ 07:01  -  12-25 @ 23:34  --------------------------------------------------------  IN: 840 mL / OUT: 0 mL / NET: 840 mL      Physical Exam:    Daily     Daily   General:  Well appearing, NAD, not cachetic  HEENT:  Nonicteric, PERRLA  CV:  RRR, S1S2   Lungs:  CTA B/L, no wheezes, rales, rhonchi  Abdomen:  Soft, non-tender, no distended, positive BS  Extremities:  2+ pulses, no c/c, no edema  Skin:  Warm and dry, no rashes  :  No mccord  Neuro:  AAOx3, non-focal, grossly intact                                                                                                                                                                                                                                                                                                LABS:                               15.5   8.59  )-----------( 253      ( 24 Dec 2024 06:32 )             45.3                      12-24    138  |  103  |  15  ----------------------------<  165[H]  3.9   |  22  |  0.72    Ca    9.3      24 Dec 2024 06:32    TPro  6.6  /  Alb  4.2  /  TBili  0.4  /  DBili  x   /  AST  40  /  ALT  59[H]  /  AlkPhos  58  12-24                       RADIOLOGY & ADDITIONAL TESTS         I personally reviewed: [  ]EKG   [  ]CXR    [  ] CT      A/P:         Discussed with :     [facility] consultants' Notes   Time spent :

## 2024-12-25 NOTE — PROGRESS NOTE ADULT - SUBJECTIVE AND OBJECTIVE BOX
Date of Service: 12-25-24 @ 10:37           CARDIOLOGY     PROGRESS  NOTE   ________________________________________________    CHIEF COMPLAINT:Patient is a 59y old  Male who presents with a chief complaint of COPD Exacerbation (24 Dec 2024 18:54)  no complain, anxious  	  REVIEW OF SYSTEMS:  CONSTITUTIONAL: No fever, weight loss, or fatigue  EYES: No eye pain, visual disturbances, or discharge  ENT:  No difficulty hearing, tinnitus, vertigo; No sinus or throat pain  NECK: No pain or stiffness  RESPIRATORY: No cough, wheezing, chills or hemoptysis; No Shortness of Breath  CARDIOVASCULAR: No chest pain, palpitations, passing out, dizziness, or leg swelling  GASTROINTESTINAL: No abdominal or epigastric pain. No nausea, vomiting, or hematemesis; No diarrhea or constipation. No melena or hematochezia.  GENITOURINARY: No dysuria, frequency, hematuria, or incontinence  NEUROLOGICAL: No headaches, memory loss, loss of strength, numbness, or tremors  SKIN: No itching, burning, rashes, or lesions   LYMPH Nodes: No enlarged glands  ENDOCRINE: No heat or cold intolerance; No hair loss  MUSCULOSKELETAL: No joint pain or swelling; No muscle, back, or extremity pain  PSYCHIATRIC: No depression, anxiety, mood swings, or difficulty sleeping  HEME/LYMPH: No easy bruising, or bleeding gums  ALLERGY AND IMMUNOLOGIC: No hives or eczema	    [x ] All others negative	  [ ] Unable to obtain    PHYSICAL EXAM:  T(C): 36.6 (12-25-24 @ 09:37), Max: 36.8 (12-24-24 @ 13:57)  HR: 110 (12-25-24 @ 09:37) (81 - 110)  BP: 130/70 (12-25-24 @ 09:37) (118/74 - 146/81)  RR: 18 (12-25-24 @ 09:37) (18 - 18)  SpO2: 93% (12-25-24 @ 09:37) (92% - 94%)  Wt(kg): --  I&O's Summary    24 Dec 2024 07:01  -  25 Dec 2024 07:00  --------------------------------------------------------  IN: 240 mL / OUT: 0 mL / NET: 240 mL    25 Dec 2024 07:01  -  25 Dec 2024 10:37  --------------------------------------------------------  IN: 240 mL / OUT: 0 mL / NET: 240 mL        Appearance: Normal	  HEENT:   Normal oral mucosa, PERRL, EOMI	  Lymphatic: No lymphadenopathy  Cardiovascular: Normal S1 S2, No JVD, + murmurs, No edema  Respiratory: rhonchi  Psychiatry: A & O x 3, Mood & affect appropriate  Gastrointestinal:  Soft, Non-tender, + BS	  Skin: No rashes, No ecchymoses, No cyanosis	  Neurologic: Non-focal  Extremities: Normal range of motion, No clubbing, cyanosis or edema  Vascular: Peripheral pulses palpable 2+ bilaterally    MEDICATIONS  (STANDING):  albuterol/ipratropium for Nebulization 3 milliLiter(s) Nebulizer every 6 hours  albuterol/ipratropium for Nebulization.. 3 milliLiter(s) Nebulizer every 20 minutes  allopurinol 200 milliGRAM(s) Oral daily  amLODIPine   Tablet 5 milliGRAM(s) Oral at bedtime  aspirin enteric coated 81 milliGRAM(s) Oral daily  enoxaparin Injectable 40 milliGRAM(s) SubCutaneous every 12 hours  fluticasone propionate/ salmeterol 100-50 MICROgram(s) Diskus 1 Dose(s) Inhalation two times a day  methylPREDNISolone sodium succinate Injectable 40 milliGRAM(s) IV Push every 12 hours  montelukast 10 milliGRAM(s) Oral at bedtime      TELEMETRY: 	    ECG:  	  RADIOLOGY:  OTHER: 	  	  LABS:	 	    CARDIAC MARKERS:                          15.5   8.59  )-----------( 253      ( 24 Dec 2024 06:32 )             45.3     12-24    138  |  103  |  15  ----------------------------<  165[H]  3.9   |  22  |  0.72    Ca    9.3      24 Dec 2024 06:32    TPro  6.6  /  Alb  4.2  /  TBili  0.4  /  DBili  x   /  AST  40  /  ALT  59[H]  /  AlkPhos  58  12-24    proBNP:   Lipid Profile: Cholesterol 162  LDL --  HDL 36    Cholesterol 167  LDL --  HDL 41  TG 75    HgA1c:   TSH:   PT/INR - ( 23 Dec 2024 15:16 )   PT: 12.6 sec;   INR: 1.10 ratio         PTT - ( 23 Dec 2024 15:16 )  PTT:28.7 sec        Assessment and plan  ---------------------------  59M w/Hx of COPD, HTN, Gout presents due to SOB and generalized malaise. Patient reports he was in his normal state of health until this morning where he felt SOB walking at work. He denies cough, congestion, wheezing. Has not smoked in 15 years. Patient used his levalbuterol inhaler this morning, but did not help him. On my exam, he reports his condition has improved since arrival to ED. He is breathing comfortably on 2L NC. Patient denies fever, chills, headache, dizziness, abd pain, nausea, vomiting, constipation, dysuria. Denies sick contacts.   pt with known hx of copd and multiple risk factor for cad with c/o new onset of MURRAY and abnormal ecg  r/o coronary insufficiency  tele  asa  daily  cath  add metoprolol  taper steroid

## 2024-12-26 DIAGNOSIS — R06.02 SHORTNESS OF BREATH: ICD-10-CM

## 2024-12-26 DIAGNOSIS — J44.9 CHRONIC OBSTRUCTIVE PULMONARY DISEASE, UNSPECIFIED: ICD-10-CM

## 2024-12-26 LAB
ANION GAP SERPL CALC-SCNC: 13 MMOL/L — SIGNIFICANT CHANGE UP (ref 5–17)
BUN SERPL-MCNC: 17 MG/DL — SIGNIFICANT CHANGE UP (ref 7–23)
CALCIUM SERPL-MCNC: 8.7 MG/DL — SIGNIFICANT CHANGE UP (ref 8.4–10.5)
CHLORIDE SERPL-SCNC: 106 MMOL/L — SIGNIFICANT CHANGE UP (ref 96–108)
CMV DNA CSF QL NAA+PROBE: SIGNIFICANT CHANGE UP IU/ML
CMV DNA SPEC NAA+PROBE-LOG#: SIGNIFICANT CHANGE UP LOG10IU/ML
CO2 SERPL-SCNC: 22 MMOL/L — SIGNIFICANT CHANGE UP (ref 22–31)
CREAT SERPL-MCNC: 0.84 MG/DL — SIGNIFICANT CHANGE UP (ref 0.5–1.3)
EBV DNA SERPL NAA+PROBE-ACNC: ABNORMAL IU/ML
EBVPCR LOG: ABNORMAL LOG10IU/ML
EGFR: 100 ML/MIN/1.73M2 — SIGNIFICANT CHANGE UP
GLUCOSE SERPL-MCNC: 86 MG/DL — SIGNIFICANT CHANGE UP (ref 70–99)
HCT VFR BLD CALC: 45.3 % — SIGNIFICANT CHANGE UP (ref 39–50)
HGB BLD-MCNC: 14.9 G/DL — SIGNIFICANT CHANGE UP (ref 13–17)
MCHC RBC-ENTMCNC: 31.6 PG — SIGNIFICANT CHANGE UP (ref 27–34)
MCHC RBC-ENTMCNC: 32.9 G/DL — SIGNIFICANT CHANGE UP (ref 32–36)
MCV RBC AUTO: 96.2 FL — SIGNIFICANT CHANGE UP (ref 80–100)
NRBC # BLD: 0 /100 WBCS — SIGNIFICANT CHANGE UP (ref 0–0)
PLATELET # BLD AUTO: 278 K/UL — SIGNIFICANT CHANGE UP (ref 150–400)
POTASSIUM SERPL-MCNC: 3.7 MMOL/L — SIGNIFICANT CHANGE UP (ref 3.5–5.3)
POTASSIUM SERPL-SCNC: 3.7 MMOL/L — SIGNIFICANT CHANGE UP (ref 3.5–5.3)
RBC # BLD: 4.71 M/UL — SIGNIFICANT CHANGE UP (ref 4.2–5.8)
RBC # FLD: 13.3 % — SIGNIFICANT CHANGE UP (ref 10.3–14.5)
SODIUM SERPL-SCNC: 141 MMOL/L — SIGNIFICANT CHANGE UP (ref 135–145)
WBC # BLD: 10.16 K/UL — SIGNIFICANT CHANGE UP (ref 3.8–10.5)
WBC # FLD AUTO: 10.16 K/UL — SIGNIFICANT CHANGE UP (ref 3.8–10.5)

## 2024-12-26 PROCEDURE — 93454 CORONARY ARTERY ANGIO S&I: CPT | Mod: 26

## 2024-12-26 PROCEDURE — 99152 MOD SED SAME PHYS/QHP 5/>YRS: CPT

## 2024-12-26 RX ORDER — TIOTROPIUM BROMIDE AND OLODATEROL 3.124; 2.736 UG/1; UG/1
2 SPRAY, METERED RESPIRATORY (INHALATION) DAILY
Refills: 0 | Status: DISCONTINUED | OUTPATIENT
Start: 2024-12-26 | End: 2024-12-27

## 2024-12-26 RX ORDER — SODIUM CHLORIDE 9 MG/ML
1000 INJECTION, SOLUTION INTRAMUSCULAR; INTRAVENOUS; SUBCUTANEOUS
Refills: 0 | Status: DISCONTINUED | OUTPATIENT
Start: 2024-12-26 | End: 2024-12-27

## 2024-12-26 RX ADMIN — TIOTROPIUM BROMIDE AND OLODATEROL 2 PUFF(S): 3.124; 2.736 SPRAY, METERED RESPIRATORY (INHALATION) at 12:33

## 2024-12-26 RX ADMIN — IPRATROPIUM BROMIDE AND ALBUTEROL SULFATE 3 MILLILITER(S): .5; 2.5 SOLUTION RESPIRATORY (INHALATION) at 17:43

## 2024-12-26 RX ADMIN — Medication 3 MILLIGRAM(S): at 21:48

## 2024-12-26 RX ADMIN — FLUTICASONE PROPIONATE AND SALMETEROL 1 DOSE(S): 50; 500 POWDER ORAL; RESPIRATORY (INHALATION) at 05:08

## 2024-12-26 RX ADMIN — IPRATROPIUM BROMIDE AND ALBUTEROL SULFATE 3 MILLILITER(S): .5; 2.5 SOLUTION RESPIRATORY (INHALATION) at 12:33

## 2024-12-26 RX ADMIN — Medication 40 MILLIGRAM(S): at 05:09

## 2024-12-26 RX ADMIN — ENOXAPARIN SODIUM 40 MILLIGRAM(S): 60 INJECTION INTRAVENOUS; SUBCUTANEOUS at 05:11

## 2024-12-26 RX ADMIN — ALLOPURINOL 200 MILLIGRAM(S): 100 TABLET ORAL at 12:33

## 2024-12-26 RX ADMIN — IPRATROPIUM BROMIDE AND ALBUTEROL SULFATE 3 MILLILITER(S): .5; 2.5 SOLUTION RESPIRATORY (INHALATION) at 05:09

## 2024-12-26 RX ADMIN — ENOXAPARIN SODIUM 40 MILLIGRAM(S): 60 INJECTION INTRAVENOUS; SUBCUTANEOUS at 17:43

## 2024-12-26 RX ADMIN — Medication 81 MILLIGRAM(S): at 12:32

## 2024-12-26 RX ADMIN — IPRATROPIUM BROMIDE AND ALBUTEROL SULFATE 3 MILLILITER(S): .5; 2.5 SOLUTION RESPIRATORY (INHALATION) at 00:13

## 2024-12-26 RX ADMIN — MONTELUKAST SODIUM 10 MILLIGRAM(S): 10 TABLET, FILM COATED ORAL at 21:48

## 2024-12-26 RX ADMIN — SODIUM CHLORIDE 75 MILLILITER(S): 9 INJECTION, SOLUTION INTRAMUSCULAR; INTRAVENOUS; SUBCUTANEOUS at 14:42

## 2024-12-26 RX ADMIN — Medication 25 MILLIGRAM(S): at 05:11

## 2024-12-26 RX ADMIN — Medication 5 MILLIGRAM(S): at 21:48

## 2024-12-26 NOTE — PROGRESS NOTE ADULT - SUBJECTIVE AND OBJECTIVE BOX
Date of service: 12-26-24 @ 14:14      Patient is a 59y old  Male who presents with a chief complaint of COPD Exacerbation (26 Dec 2024 09:45)                                                               INTERVAL HPI/OVERNIGHT EVENTS:    REVIEW OF SYSTEMS:    Patient was seen earlier in the day  Was denying any chest pain or shortness of breath                                                                                                                                                                                                                                                                                Medications:  MEDICATIONS  (STANDING):  albuterol/ipratropium for Nebulization 3 milliLiter(s) Nebulizer every 6 hours  albuterol/ipratropium for Nebulization.. 3 milliLiter(s) Nebulizer every 20 minutes  allopurinol 200 milliGRAM(s) Oral daily  amLODIPine   Tablet 5 milliGRAM(s) Oral at bedtime  aspirin enteric coated 81 milliGRAM(s) Oral daily  enoxaparin Injectable 40 milliGRAM(s) SubCutaneous every 12 hours  metoprolol succinate ER 25 milliGRAM(s) Oral daily  montelukast 10 milliGRAM(s) Oral at bedtime  sodium chloride 0.9%. 1000 milliLiter(s) (75 mL/Hr) IV Continuous <Continuous>  tiotropium 2.5 MICROgram(s)/olodaterol 2.5 MICROgram(s) (STIOLTO) Inhaler 2 Puff(s) Inhalation daily    MEDICATIONS  (PRN):  acetaminophen     Tablet .. 650 milliGRAM(s) Oral every 6 hours PRN Temp greater or equal to 38C (100.4F), Mild Pain (1 - 3)  melatonin 3 milliGRAM(s) Oral at bedtime PRN Insomnia       Allergies    No Known Drug Allergies  shellfish (Anaphylaxis; Rash)    Intolerances      Vital Signs Last 24 Hrs  T(C): 36.6 (26 Dec 2024 21:47), Max: 36.7 (26 Dec 2024 05:07)  T(F): 97.8 (26 Dec 2024 21:47), Max: 98 (26 Dec 2024 05:07)  HR: 64 (26 Dec 2024 21:47) (64 - 86)  BP: 124/74 (26 Dec 2024 21:47) (124/74 - 153/83)  BP(mean): --  RR: 18 (26 Dec 2024 21:47) (18 - 18)  SpO2: 95% (26 Dec 2024 21:47) (92% - 95%)    Parameters below as of 26 Dec 2024 21:47  Patient On (Oxygen Delivery Method): room air      CAPILLARY BLOOD GLUCOSE          12-25 @ 07:01  -  12-26 @ 07:00  --------------------------------------------------------  IN: 960 mL / OUT: 0 mL / NET: 960 mL    12-26 @ 07:01  -  12-26 @ 23:59  --------------------------------------------------------  IN: 600 mL / OUT: 0 mL / NET: 600 mL      Physical Exam:    Daily     Daily   General: NAD  HEENT:  Nonicteric, PERRLA  CV:  RRR, S1S2   Lungs:  CTA B/L, no wheezes, rales, rhonchi  Abdomen:  Soft, non-tender, no distended, positive BS  Extremities: No edema                                                                                                                                                                                                                                                                                             LABS:                               14.9   10.16 )-----------( 278      ( 26 Dec 2024 08:04 )             45.3                      12-26    141  |  106  |  17  ----------------------------<  86  3.7   |  22  |  0.84    Ca    8.7      26 Dec 2024 08:09                         RADIOLOGY & ADDITIONAL TESTS         I personally reviewed: [  ]EKG   [  ]CXR    [  ] CT

## 2024-12-26 NOTE — CONSULT NOTE ADULT - ASSESSMENT
60 y/o M with PMH of COPD, HTN, Gout. Presents with SOB and generalized malaise. Patient reports he was in his normal state of health until this morning where he felt SOB walking at work. He denies cough, congestion, wheezing. Has not smoked in 15 years. Patient used his levalbuterol inhaler this morning, but did not help him.

## 2024-12-26 NOTE — PROGRESS NOTE ADULT - SUBJECTIVE AND OBJECTIVE BOX
Date of Service: 12-26-24 @ 08:26           CARDIOLOGY     PROGRESS  NOTE   ________________________________________________    CHIEF COMPLAINT:Patient is a 59y old  Male who presents with a chief complaint of COPD Exacerbation (25 Dec 2024 23:34)  no complain  	  REVIEW OF SYSTEMS:  CONSTITUTIONAL: No fever, weight loss, or fatigue  EYES: No eye pain, visual disturbances, or discharge  ENT:  No difficulty hearing, tinnitus, vertigo; No sinus or throat pain  NECK: No pain or stiffness  RESPIRATORY: No cough, wheezing, chills or hemoptysis; No Shortness of Breath  CARDIOVASCULAR: No chest pain, palpitations, passing out, dizziness, or leg swelling  GASTROINTESTINAL: No abdominal or epigastric pain. No nausea, vomiting, or hematemesis; No diarrhea or constipation. No melena or hematochezia.  GENITOURINARY: No dysuria, frequency, hematuria, or incontinence  NEUROLOGICAL: No headaches, memory loss, loss of strength, numbness, or tremors  SKIN: No itching, burning, rashes, or lesions   LYMPH Nodes: No enlarged glands  ENDOCRINE: No heat or cold intolerance; No hair loss  MUSCULOSKELETAL: No joint pain or swelling; No muscle, back, or extremity pain  PSYCHIATRIC: No depression, anxiety, mood swings, or difficulty sleeping  HEME/LYMPH: No easy bruising, or bleeding gums  ALLERGY AND IMMUNOLOGIC: No hives or eczema	    x[ ] All others negative	  [ ] Unable to obtain    PHYSICAL EXAM:  T(C): 36.7 (12-26-24 @ 05:07), Max: 36.8 (12-25-24 @ 17:14)  HR: 86 (12-26-24 @ 05:07) (72 - 110)  BP: 127/82 (12-26-24 @ 05:07) (120/70 - 156/73)  RR: 18 (12-26-24 @ 05:07) (18 - 18)  SpO2: 93% (12-26-24 @ 05:07) (93% - 95%)  Wt(kg): --  I&O's Summary    25 Dec 2024 07:01  -  26 Dec 2024 07:00  --------------------------------------------------------  IN: 960 mL / OUT: 0 mL / NET: 960 mL        Appearance: Normal	  HEENT:   Normal oral mucosa, PERRL, EOMI	  Lymphatic: No lymphadenopathy  Cardiovascular: Normal S1 S2, No JVD, + murmurs, No edema  Respiratory: Lungs clear to auscultation	  Psychiatry: A & O x 3, Mood & affect appropriate  Gastrointestinal:  Soft, Non-tender, + BS	  Skin: No rashes, No ecchymoses, No cyanosis	  Neurologic: Non-focal  Extremities: Normal range of motion, No clubbing, cyanosis or edema  Vascular: Peripheral pulses palpable 2+ bilaterally    MEDICATIONS  (STANDING):  albuterol/ipratropium for Nebulization 3 milliLiter(s) Nebulizer every 6 hours  albuterol/ipratropium for Nebulization.. 3 milliLiter(s) Nebulizer every 20 minutes  allopurinol 200 milliGRAM(s) Oral daily  amLODIPine   Tablet 5 milliGRAM(s) Oral at bedtime  aspirin enteric coated 81 milliGRAM(s) Oral daily  enoxaparin Injectable 40 milliGRAM(s) SubCutaneous every 12 hours  fluticasone propionate/ salmeterol 100-50 MICROgram(s) Diskus 1 Dose(s) Inhalation two times a day  metoprolol succinate ER 25 milliGRAM(s) Oral daily  montelukast 10 milliGRAM(s) Oral at bedtime  predniSONE   Tablet 40 milliGRAM(s) Oral daily      TELEMETRY: 	    ECG:  	  RADIOLOGY:  OTHER: 	  	  LABS:	 	    CARDIAC MARKERS:    proBNP:   Lipid Profile: Cholesterol 162  LDL --  HDL 36    Cholesterol 167  LDL --  HDL 41  TG 75    HgA1c:   TSH:     Assessment and plan  ---------------------------  59M w/Hx of COPD, HTN, Gout presents due to SOB and generalized malaise. Patient reports he was in his normal state of health until this morning where he felt SOB walking at work. He denies cough, congestion, wheezing. Has not smoked in 15 years. Patient used his levalbuterol inhaler this morning, but did not help him. On my exam, he reports his condition has improved since arrival to ED. He is breathing comfortably on 2L NC. Patient denies fever, chills, headache, dizziness, abd pain, nausea, vomiting, constipation, dysuria. Denies sick contacts.   pt with known hx of copd and multiple risk factor for cad with c/o new onset of MURRAY and abnormal ecg  r/o coronary insufficiency  tele no arrythmia  asa  daily  cath today  add metoprolol  taper steroid

## 2024-12-26 NOTE — CONSULT NOTE ADULT - SUBJECTIVE AND OBJECTIVE BOX
PULMONARY CONSULT    HPI: 58 y/o M with PMH of COPD, HTN, Gout. Presents with SOB and generalized malaise. Patient reports he was in his normal state of health until this morning where he felt SOB walking at work. He denies cough, congestion, wheezing. Has not smoked in 15 years. Patient used his levalbuterol inhaler this morning, but did not help him    PAST MEDICAL & SURGICAL HISTORY:  COPD (chronic obstructive pulmonary disease)  Prostate cancer  H/O umbilical hernia repair  Mild persistent asthma  H/O prostatectomy  H/O umbilical hernia repair      Allergies  No Known Drug Allergies  shellfish (Anaphylaxis; Rash)      FAMILY HISTORY:    Social history: former smoker     Review of Systems:  CONSTITUTIONAL: Per above   EYES: No eye pain, visual disturbances, or discharge  ENMT:  No difficulty hearing, tinnitus, vertigo; No sinus or throat pain  NECK: No pain or stiffness  RESPIRATORY: Per above  CARDIOVASCULAR: No chest pain, palpitations, dizziness, or leg swelling  GASTROINTESTINAL: No abdominal or epigastric pain. No nausea, vomiting, or hematemesis; No diarrhea or constipation. No melena or hematochezia.  GENITOURINARY: No dysuria, frequency, hematuria, or incontinence  NEUROLOGICAL: No headaches, memory loss, loss of strength, numbness, or tremors  SKIN: No itching, burning, rashes, or lesions   MUSCULOSKELETAL: No joint pain or swelling; No muscle, back, or extremity pain  PSYCHIATRIC: No depression, anxiety, mood swings, or difficulty sleeping      Medications:  MEDICATIONS  (STANDING):  albuterol/ipratropium for Nebulization 3 milliLiter(s) Nebulizer every 6 hours  albuterol/ipratropium for Nebulization.. 3 milliLiter(s) Nebulizer every 20 minutes  allopurinol 200 milliGRAM(s) Oral daily  amLODIPine   Tablet 5 milliGRAM(s) Oral at bedtime  aspirin enteric coated 81 milliGRAM(s) Oral daily  enoxaparin Injectable 40 milliGRAM(s) SubCutaneous every 12 hours  fluticasone propionate/ salmeterol 100-50 MICROgram(s) Diskus 1 Dose(s) Inhalation two times a day  metoprolol succinate ER 25 milliGRAM(s) Oral daily  montelukast 10 milliGRAM(s) Oral at bedtime  predniSONE   Tablet 40 milliGRAM(s) Oral daily    MEDICATIONS  (PRN):  acetaminophen     Tablet .. 650 milliGRAM(s) Oral every 6 hours PRN Temp greater or equal to 38C (100.4F), Mild Pain (1 - 3)  melatonin 3 milliGRAM(s) Oral at bedtime PRN Insomnia            Vital Signs Last 24 Hrs  T(C): 36.6 (26 Dec 2024 09:18), Max: 36.8 (25 Dec 2024 17:14)  T(F): 97.8 (26 Dec 2024 09:18), Max: 98.3 (25 Dec 2024 17:14)  HR: 71 (26 Dec 2024 09:18) (71 - 102)  BP: 124/74 (26 Dec 2024 09:18) (120/70 - 156/73)  BP(mean): --  RR: 18 (26 Dec 2024 09:18) (18 - 18)  SpO2: 95% (26 Dec 2024 09:18) (93% - 95%)    Parameters below as of 26 Dec 2024 09:18  Patient On (Oxygen Delivery Method): room air                12-25 @ 07:01  -  12-26 @ 07:00  --------------------------------------------------------  IN: 960 mL / OUT: 0 mL / NET: 960 mL          LABS:                        14.9   10.16 )-----------( 278      ( 26 Dec 2024 08:04 )             45.3     12-26    141  |  106  |  17  ----------------------------<  86  3.7   |  22  |  0.84    Ca    8.7      26 Dec 2024 08:09            CAPILLARY BLOOD GLUCOSE          Urinalysis Basic - ( 26 Dec 2024 08:09 )    Color: x / Appearance: x / SG: x / pH: x  Gluc: 86 mg/dL / Ketone: x  / Bili: x / Urobili: x   Blood: x / Protein: x / Nitrite: x   Leuk Esterase: x / RBC: x / WBC x   Sq Epi: x / Non Sq Epi: x / Bacteria: x                    Physical Examination:    General: No acute distress.      HEENT: Pupils equal, reactive to light.  Symmetric.    PULM: Clear to auscultation bilaterally, no significant sputum production    CVS: S1, S2    ABD: Soft, nondistended, nontender, normoactive bowel sounds, no masses    EXT: No edema, nontender    SKIN: Warm and well perfused, no rashes noted.    NEURO: Alert, oriented, interactive, nonfocal    RADIOLOGY REVIEWED  CXR:    CT chest:    TTE:   PULMONARY CONSULT    HPI: 60 y/o M with PMH of COPD, HTN, Gout. Presents with SOB and generalized malaise. Patient reports he was in his normal state of health until this morning where he felt SOB walking at work. He denies cough, congestion, wheezing. Has not smoked in 15 years. Patient used his levalbuterol inhaler this morning, but did not help him. Pt endorses that prednisone is giving him diarrhea. Denies fevers, chills, wheezing, SOB, CP, pleuritic CP.           PAST MEDICAL & SURGICAL HISTORY:  COPD (chronic obstructive pulmonary disease)  Prostate cancer  H/O umbilical hernia repair  Mild persistent asthma  H/O prostatectomy  H/O umbilical hernia repair      Allergies  No Known Drug Allergies  shellfish (Anaphylaxis; Rash)      FAMILY HISTORY: non contributory     Social history: former smoker     Review of Systems:  CONSTITUTIONAL: Per above   EYES: No eye pain, visual disturbances, or discharge  ENMT:  No difficulty hearing, tinnitus, vertigo; No sinus or throat pain  NECK: No pain or stiffness  RESPIRATORY: Per above  CARDIOVASCULAR: No chest pain, palpitations, dizziness, or leg swelling  GASTROINTESTINAL: No abdominal or epigastric pain. No nausea, vomiting, or hematemesis; No diarrhea or constipation. No melena or hematochezia.  GENITOURINARY: No dysuria, frequency, hematuria, or incontinence  NEUROLOGICAL: No headaches, memory loss, loss of strength, numbness, or tremors  SKIN: No itching, burning, rashes, or lesions   MUSCULOSKELETAL: No joint pain or swelling; No muscle, back, or extremity pain  PSYCHIATRIC: No depression, anxiety, mood swings, or difficulty sleeping      Medications:  MEDICATIONS  (STANDING):  albuterol/ipratropium for Nebulization 3 milliLiter(s) Nebulizer every 6 hours  albuterol/ipratropium for Nebulization.. 3 milliLiter(s) Nebulizer every 20 minutes  allopurinol 200 milliGRAM(s) Oral daily  amLODIPine   Tablet 5 milliGRAM(s) Oral at bedtime  aspirin enteric coated 81 milliGRAM(s) Oral daily  enoxaparin Injectable 40 milliGRAM(s) SubCutaneous every 12 hours  fluticasone propionate/ salmeterol 100-50 MICROgram(s) Diskus 1 Dose(s) Inhalation two times a day  metoprolol succinate ER 25 milliGRAM(s) Oral daily  montelukast 10 milliGRAM(s) Oral at bedtime  predniSONE   Tablet 40 milliGRAM(s) Oral daily    MEDICATIONS  (PRN):  acetaminophen     Tablet .. 650 milliGRAM(s) Oral every 6 hours PRN Temp greater or equal to 38C (100.4F), Mild Pain (1 - 3)  melatonin 3 milliGRAM(s) Oral at bedtime PRN Insomnia            Vital Signs Last 24 Hrs  T(C): 36.6 (26 Dec 2024 09:18), Max: 36.8 (25 Dec 2024 17:14)  T(F): 97.8 (26 Dec 2024 09:18), Max: 98.3 (25 Dec 2024 17:14)  HR: 71 (26 Dec 2024 09:18) (71 - 102)  BP: 124/74 (26 Dec 2024 09:18) (120/70 - 156/73)  BP(mean): --  RR: 18 (26 Dec 2024 09:18) (18 - 18)  SpO2: 95% (26 Dec 2024 09:18) (93% - 95%)    Parameters below as of 26 Dec 2024 09:18  Patient On (Oxygen Delivery Method): room air                12-25 @ 07:01  -  12-26 @ 07:00  --------------------------------------------------------  IN: 960 mL / OUT: 0 mL / NET: 960 mL          LABS:                        14.9   10.16 )-----------( 278      ( 26 Dec 2024 08:04 )             45.3     12-26    141  |  106  |  17  ----------------------------<  86  3.7   |  22  |  0.84    Ca    8.7      26 Dec 2024 08:09            CAPILLARY BLOOD GLUCOSE          Urinalysis Basic - ( 26 Dec 2024 08:09 )    Color: x / Appearance: x / SG: x / pH: x  Gluc: 86 mg/dL / Ketone: x  / Bili: x / Urobili: x   Blood: x / Protein: x / Nitrite: x   Leuk Esterase: x / RBC: x / WBC x   Sq Epi: x / Non Sq Epi: x / Bacteria: x                    Physical Examination:    General: No acute distress.      HEENT: Pupils equal, reactive to light.  Symmetric.    PULM: Clear to auscultation bilaterally, no significant sputum production    CVS: S1, S2    ABD: Soft, nondistended, nontender, normoactive bowel sounds, no masses    EXT: No edema, nontender    SKIN: Warm and well perfused, no rashes noted.    NEURO: Alert, oriented, interactive, nonfocal    RADIOLOGY REVIEWED  CXR: grossly clear

## 2024-12-26 NOTE — PROGRESS NOTE ADULT - SUBJECTIVE AND OBJECTIVE BOX
Date of service: 12-26-24 @ 23:59      Patient is a 59y old  Male who presents with a chief complaint of COPD Exacerbation (26 Dec 2024 09:45)                                                               INTERVAL HPI/OVERNIGHT EVENTS:    REVIEW OF SYSTEMS:     CONSTITUTIONAL: No weakness, fevers or chills  EYES/ENT: No visual changes , no ear ache   NECK: No pain or stiffness  RESPIRATORY: No cough, wheezing,  No shortness of breath  CARDIOVASCULAR: No chest pain or palpitations  GASTROINTESTINAL: No abdominal pain  . No nausea, vomiting, or hematemesis; No diarrhea or constipation. No melena or hematochezia.  GENITOURINARY: No dysuria, frequency or hematuria  NEUROLOGICAL: No numbness or weakness  SKIN: No itching, burning, rashes, or lesions                                                                                                                                                                                                                                                                                 Medications:  MEDICATIONS  (STANDING):  albuterol/ipratropium for Nebulization 3 milliLiter(s) Nebulizer every 6 hours  albuterol/ipratropium for Nebulization.. 3 milliLiter(s) Nebulizer every 20 minutes  allopurinol 200 milliGRAM(s) Oral daily  amLODIPine   Tablet 5 milliGRAM(s) Oral at bedtime  aspirin enteric coated 81 milliGRAM(s) Oral daily  enoxaparin Injectable 40 milliGRAM(s) SubCutaneous every 12 hours  metoprolol succinate ER 25 milliGRAM(s) Oral daily  montelukast 10 milliGRAM(s) Oral at bedtime  sodium chloride 0.9%. 1000 milliLiter(s) (75 mL/Hr) IV Continuous <Continuous>  tiotropium 2.5 MICROgram(s)/olodaterol 2.5 MICROgram(s) (STIOLTO) Inhaler 2 Puff(s) Inhalation daily    MEDICATIONS  (PRN):  acetaminophen     Tablet .. 650 milliGRAM(s) Oral every 6 hours PRN Temp greater or equal to 38C (100.4F), Mild Pain (1 - 3)  melatonin 3 milliGRAM(s) Oral at bedtime PRN Insomnia       Allergies    No Known Drug Allergies  shellfish (Anaphylaxis; Rash)    Intolerances      Vital Signs Last 24 Hrs  T(C): 36.6 (26 Dec 2024 21:47), Max: 36.7 (26 Dec 2024 05:07)  T(F): 97.8 (26 Dec 2024 21:47), Max: 98 (26 Dec 2024 05:07)  HR: 64 (26 Dec 2024 21:47) (64 - 86)  BP: 124/74 (26 Dec 2024 21:47) (124/74 - 153/83)  BP(mean): --  RR: 18 (26 Dec 2024 21:47) (18 - 18)  SpO2: 95% (26 Dec 2024 21:47) (92% - 95%)    Parameters below as of 26 Dec 2024 21:47  Patient On (Oxygen Delivery Method): room air      CAPILLARY BLOOD GLUCOSE          12-25 @ 07:01  -  12-26 @ 07:00  --------------------------------------------------------  IN: 960 mL / OUT: 0 mL / NET: 960 mL    12-26 @ 07:01  -  12-26 @ 23:59  --------------------------------------------------------  IN: 600 mL / OUT: 0 mL / NET: 600 mL      Physical Exam:    Daily     Daily   General:  Well appearing, NAD, not cachetic  HEENT:  Nonicteric, PERRLA  CV:  RRR, S1S2   Lungs:  CTA B/L, no wheezes, rales, rhonchi  Abdomen:  Soft, non-tender, no distended, positive BS  Extremities:  2+ pulses, no c/c, no edema  Skin:  Warm and dry, no rashes  :  No mccord  Neuro:  AAOx3, non-focal, grossly intact                                                                                                                                                                                                                                                                                                LABS:                               14.9   10.16 )-----------( 278      ( 26 Dec 2024 08:04 )             45.3                      12-26    141  |  106  |  17  ----------------------------<  86  3.7   |  22  |  0.84    Ca    8.7      26 Dec 2024 08:09                         RADIOLOGY & ADDITIONAL TESTS         I personally reviewed: [  ]EKG   [  ]CXR    [  ] CT      A/P:         Discussed with :     [facility] consultants' Notes   Time spent :

## 2024-12-26 NOTE — CONSULT NOTE ADULT - NS ATTEND AMEND GEN_ALL_CORE FT
Keep sats >90% with o2 PRN.   D/c prednisone observe off steroids   Plans for cardiac cath today  Start Stiolto  dw pt

## 2024-12-26 NOTE — CONSULT NOTE ADULT - PROBLEM SELECTOR RECOMMENDATION 9
possibly cardiac in nature  -CXR with clear lungs  -No wheezing on exam  -No documented hypoxia, currently on RA. Keep sats >90% with o2 PRN.   -D/c prednisone, observe off steroids at this time   -Plans for cardiac cath today

## 2024-12-26 NOTE — CONSULT NOTE ADULT - PROBLEM SELECTOR RECOMMENDATION 2
by hx  -No wheezing on exam at this time  -D/c Advair, start Stiolto 2 puffs qd (home med: Anoro Ellipta)  -Continue Duoneb q6h for now

## 2024-12-27 VITALS
DIASTOLIC BLOOD PRESSURE: 56 MMHG | RESPIRATION RATE: 18 BRPM | SYSTOLIC BLOOD PRESSURE: 124 MMHG | TEMPERATURE: 98 F | HEART RATE: 68 BPM | OXYGEN SATURATION: 95 %

## 2024-12-27 LAB
ADD ON TEST-SPECIMEN IN LAB: SIGNIFICANT CHANGE UP
ALBUMIN SERPL ELPH-MCNC: 3.7 G/DL — SIGNIFICANT CHANGE UP (ref 3.3–5)
ALP SERPL-CCNC: 54 U/L — SIGNIFICANT CHANGE UP (ref 40–120)
ALT FLD-CCNC: 121 U/L — HIGH (ref 10–45)
ANION GAP SERPL CALC-SCNC: 13 MMOL/L — SIGNIFICANT CHANGE UP (ref 5–17)
AST SERPL-CCNC: 101 U/L — HIGH (ref 10–40)
BILIRUB DIRECT SERPL-MCNC: <0.1 MG/DL — SIGNIFICANT CHANGE UP (ref 0–0.3)
BILIRUB INDIRECT FLD-MCNC: >0.3 MG/DL — SIGNIFICANT CHANGE UP (ref 0.2–1)
BILIRUB SERPL-MCNC: 0.4 MG/DL — SIGNIFICANT CHANGE UP (ref 0.2–1.2)
BUN SERPL-MCNC: 18 MG/DL — SIGNIFICANT CHANGE UP (ref 7–23)
CALCIUM SERPL-MCNC: 8.9 MG/DL — SIGNIFICANT CHANGE UP (ref 8.4–10.5)
CHLORIDE SERPL-SCNC: 103 MMOL/L — SIGNIFICANT CHANGE UP (ref 96–108)
CK SERPL-CCNC: 132 U/L — SIGNIFICANT CHANGE UP (ref 30–200)
CO2 SERPL-SCNC: 25 MMOL/L — SIGNIFICANT CHANGE UP (ref 22–31)
CREAT SERPL-MCNC: 0.96 MG/DL — SIGNIFICANT CHANGE UP (ref 0.5–1.3)
EGFR: 90 ML/MIN/1.73M2 — SIGNIFICANT CHANGE UP
GLUCOSE SERPL-MCNC: 81 MG/DL — SIGNIFICANT CHANGE UP (ref 70–99)
HCT VFR BLD CALC: 45.7 % — SIGNIFICANT CHANGE UP (ref 39–50)
HGB BLD-MCNC: 15 G/DL — SIGNIFICANT CHANGE UP (ref 13–17)
MAGNESIUM SERPL-MCNC: 2.2 MG/DL — SIGNIFICANT CHANGE UP (ref 1.6–2.6)
MCHC RBC-ENTMCNC: 30.9 PG — SIGNIFICANT CHANGE UP (ref 27–34)
MCHC RBC-ENTMCNC: 32.8 G/DL — SIGNIFICANT CHANGE UP (ref 32–36)
MCV RBC AUTO: 94 FL — SIGNIFICANT CHANGE UP (ref 80–100)
NRBC # BLD: 0 /100 WBCS — SIGNIFICANT CHANGE UP (ref 0–0)
PHOSPHATE SERPL-MCNC: 3.2 MG/DL — SIGNIFICANT CHANGE UP (ref 2.5–4.5)
PLATELET # BLD AUTO: 297 K/UL — SIGNIFICANT CHANGE UP (ref 150–400)
POTASSIUM SERPL-MCNC: 3.1 MMOL/L — LOW (ref 3.5–5.3)
POTASSIUM SERPL-SCNC: 3.1 MMOL/L — LOW (ref 3.5–5.3)
PROT SERPL-MCNC: 6.1 G/DL — SIGNIFICANT CHANGE UP (ref 6–8.3)
RBC # BLD: 4.86 M/UL — SIGNIFICANT CHANGE UP (ref 4.2–5.8)
RBC # FLD: 13 % — SIGNIFICANT CHANGE UP (ref 10.3–14.5)
SODIUM SERPL-SCNC: 141 MMOL/L — SIGNIFICANT CHANGE UP (ref 135–145)
TSH SERPL-MCNC: 1.44 UIU/ML — SIGNIFICANT CHANGE UP (ref 0.27–4.2)
WBC # BLD: 8.54 K/UL — SIGNIFICANT CHANGE UP (ref 3.8–10.5)
WBC # FLD AUTO: 8.54 K/UL — SIGNIFICANT CHANGE UP (ref 3.8–10.5)

## 2024-12-27 PROCEDURE — 82330 ASSAY OF CALCIUM: CPT

## 2024-12-27 PROCEDURE — 84484 ASSAY OF TROPONIN QUANT: CPT

## 2024-12-27 PROCEDURE — 82550 ASSAY OF CK (CPK): CPT

## 2024-12-27 PROCEDURE — 86665 EPSTEIN-BARR CAPSID VCA: CPT

## 2024-12-27 PROCEDURE — C1769: CPT

## 2024-12-27 PROCEDURE — 71045 X-RAY EXAM CHEST 1 VIEW: CPT

## 2024-12-27 PROCEDURE — 82435 ASSAY OF BLOOD CHLORIDE: CPT

## 2024-12-27 PROCEDURE — 87637 SARSCOV2&INF A&B&RSV AMP PRB: CPT

## 2024-12-27 PROCEDURE — 76705 ECHO EXAM OF ABDOMEN: CPT

## 2024-12-27 PROCEDURE — 84295 ASSAY OF SERUM SODIUM: CPT

## 2024-12-27 PROCEDURE — 85027 COMPLETE CBC AUTOMATED: CPT

## 2024-12-27 PROCEDURE — 85018 HEMOGLOBIN: CPT

## 2024-12-27 PROCEDURE — 83880 ASSAY OF NATRIURETIC PEPTIDE: CPT

## 2024-12-27 PROCEDURE — 93454 CORONARY ARTERY ANGIO S&I: CPT

## 2024-12-27 PROCEDURE — 80048 BASIC METABOLIC PNL TOTAL CA: CPT

## 2024-12-27 PROCEDURE — 87799 DETECT AGENT NOS DNA QUANT: CPT

## 2024-12-27 PROCEDURE — 86663 EPSTEIN-BARR ANTIBODY: CPT

## 2024-12-27 PROCEDURE — 85379 FIBRIN DEGRADATION QUANT: CPT

## 2024-12-27 PROCEDURE — 85025 COMPLETE CBC W/AUTO DIFF WBC: CPT

## 2024-12-27 PROCEDURE — 80074 ACUTE HEPATITIS PANEL: CPT

## 2024-12-27 PROCEDURE — 80076 HEPATIC FUNCTION PANEL: CPT

## 2024-12-27 PROCEDURE — 84443 ASSAY THYROID STIM HORMONE: CPT

## 2024-12-27 PROCEDURE — 96374 THER/PROPH/DIAG INJ IV PUSH: CPT

## 2024-12-27 PROCEDURE — 84100 ASSAY OF PHOSPHORUS: CPT

## 2024-12-27 PROCEDURE — 80061 LIPID PANEL: CPT

## 2024-12-27 PROCEDURE — 85014 HEMATOCRIT: CPT

## 2024-12-27 PROCEDURE — 86664 EPSTEIN-BARR NUCLEAR ANTIGEN: CPT

## 2024-12-27 PROCEDURE — 99285 EMERGENCY DEPT VISIT HI MDM: CPT | Mod: 25

## 2024-12-27 PROCEDURE — C1887: CPT

## 2024-12-27 PROCEDURE — 94640 AIRWAY INHALATION TREATMENT: CPT

## 2024-12-27 PROCEDURE — 82803 BLOOD GASES ANY COMBINATION: CPT

## 2024-12-27 PROCEDURE — 85610 PROTHROMBIN TIME: CPT

## 2024-12-27 PROCEDURE — 84132 ASSAY OF SERUM POTASSIUM: CPT

## 2024-12-27 PROCEDURE — 83605 ASSAY OF LACTIC ACID: CPT

## 2024-12-27 PROCEDURE — 80053 COMPREHEN METABOLIC PANEL: CPT

## 2024-12-27 PROCEDURE — 93005 ELECTROCARDIOGRAM TRACING: CPT

## 2024-12-27 PROCEDURE — C1894: CPT

## 2024-12-27 PROCEDURE — 83036 HEMOGLOBIN GLYCOSYLATED A1C: CPT

## 2024-12-27 PROCEDURE — 82947 ASSAY GLUCOSE BLOOD QUANT: CPT

## 2024-12-27 PROCEDURE — 83735 ASSAY OF MAGNESIUM: CPT

## 2024-12-27 PROCEDURE — 85730 THROMBOPLASTIN TIME PARTIAL: CPT

## 2024-12-27 RX ORDER — ATORVASTATIN CALCIUM 40 MG/1
20 TABLET, FILM COATED ORAL AT BEDTIME
Refills: 0 | Status: DISCONTINUED | OUTPATIENT
Start: 2024-12-27 | End: 2024-12-27

## 2024-12-27 RX ORDER — METOPROLOL TARTRATE 50 MG
1 TABLET ORAL
Qty: 30 | Refills: 0
Start: 2024-12-27 | End: 2025-01-25

## 2024-12-27 RX ADMIN — Medication 81 MILLIGRAM(S): at 12:21

## 2024-12-27 RX ADMIN — ENOXAPARIN SODIUM 40 MILLIGRAM(S): 60 INJECTION INTRAVENOUS; SUBCUTANEOUS at 05:27

## 2024-12-27 RX ADMIN — TIOTROPIUM BROMIDE AND OLODATEROL 2 PUFF(S): 3.124; 2.736 SPRAY, METERED RESPIRATORY (INHALATION) at 12:28

## 2024-12-27 RX ADMIN — IPRATROPIUM BROMIDE AND ALBUTEROL SULFATE 3 MILLILITER(S): .5; 2.5 SOLUTION RESPIRATORY (INHALATION) at 05:27

## 2024-12-27 RX ADMIN — ALLOPURINOL 200 MILLIGRAM(S): 100 TABLET ORAL at 12:21

## 2024-12-27 RX ADMIN — IPRATROPIUM BROMIDE AND ALBUTEROL SULFATE 3 MILLILITER(S): .5; 2.5 SOLUTION RESPIRATORY (INHALATION) at 12:25

## 2024-12-27 RX ADMIN — Medication 25 MILLIGRAM(S): at 05:27

## 2024-12-27 RX ADMIN — IPRATROPIUM BROMIDE AND ALBUTEROL SULFATE 3 MILLILITER(S): .5; 2.5 SOLUTION RESPIRATORY (INHALATION) at 00:22

## 2024-12-27 NOTE — PROGRESS NOTE ADULT - PROBLEM SELECTOR PROBLEM 2
Chronic obstructive pulmonary disease (COPD) Informed Consent Process Documentation    Study Name: Geniculate Artery Embolization     ICF Version Date / IRB Approval Date:  1/18/2024    Date of Approach/Consent: Oct 15, 2024       The subject was told:  -that the study involves research   -the purpose of the research study  -the expected duration of the study and the approximate number of subject sought  -of procedures that are identified as experimental  -of reasonably foreseeable risks or discomforts to the subject  -of any benefits to the subject or others that may be expected from the research  -of alternative procedures and/or treatment  -how the confidentiality of records would be maintained  -whether or not compensation and medical treatments are available should injury occur as a result of the study  -who to contact if they have questions related to the research study or questions regarding research subjects' rights  -that participation is completely voluntary and that their decision to or not to participate will have no impact on their relationships with the [facility] and the staff    No study procedures were completed prior to the consent being obtained.  The use of historical information (lab or assessments) used for the purpose of the study was approved by subject.  The subject was fully aware that we would be reviewing their medical record for the study.    The subject demonstrated an understanding of what the study involved.  Specifically, how this study differed from standard of care at our center and what was required of the subject as part of the study.    The subject reviewed the consent form and was given the opportunity to ask questions before signing.  Questions and concerns were answered by the study staff and/or study physician.    A copy of the signed informed consent document was provided to the subject.  [x] Yes [] No    The subject was offered a copy of the signed informed consent but declined. [] Yes [x] No    The consent required the use  of a :   [] Yes [x]  No       A 'short form' consent was used:     [] Yes [x] No         If yes, provide name of witness:     The subject required a legally-authorized representative (LAR) to sign on their behalf:                                                    [] Yes  [x] No   If yes, please record name of LAR:     Questions to Evaluate Subject Comprehension of Study:    Question: Adequate Response? If No, explain what actions were taken   What is being studied? [x] Yes  [] No   If you participate, what will be different than if you decide not to participate?  [x] Yes  [] No   How long will the study last; will you be required to return for visits?  [x] Yes  [] No   What kinds of risks are there?  [x] Yes  [] No   Do you understand that you can withdraw consent at any time and for any reason while participating in the study?   [x] Yes  [] No       :              Avelina Blanco                        :       ASHLEIGH PINEDO      Physical Exam

## 2024-12-27 NOTE — PROGRESS NOTE ADULT - PROBLEM SELECTOR PLAN 1
-CXR with clear lungs  -No wheezing on exam  -No documented hypoxia, currently on RA. Keep sats >90% with o2 PRN.   -Observe off steroids at this time   -S/p cardiac cath 12/26, found to have mild to moderate nonobstructive CAD. Management per cardiology.   -SOB has resolved at this time

## 2024-12-27 NOTE — PROGRESS NOTE ADULT - SUBJECTIVE AND OBJECTIVE BOX
Date of Service: 12-27-24 @ 10:50           CARDIOLOGY     PROGRESS  NOTE   ________________________________________________    CHIEF COMPLAINT:Patient is a 59y old  Male who presents with a chief complaint of COPD Exacerbation (27 Dec 2024 10:37)  no complain  	  REVIEW OF SYSTEMS:  CONSTITUTIONAL: No fever, weight loss, or fatigue  EYES: No eye pain, visual disturbances, or discharge  ENT:  No difficulty hearing, tinnitus, vertigo; No sinus or throat pain  NECK: No pain or stiffness  RESPIRATORY: No cough, wheezing, chills or hemoptysis; No Shortness of Breath  CARDIOVASCULAR: No chest pain, palpitations, passing out, dizziness, or leg swelling  GASTROINTESTINAL: No abdominal or epigastric pain. No nausea, vomiting, or hematemesis; No diarrhea or constipation. No melena or hematochezia.  GENITOURINARY: No dysuria, frequency, hematuria, or incontinence  NEUROLOGICAL: No headaches, memory loss, loss of strength, numbness, or tremors  SKIN: No itching, burning, rashes, or lesions   LYMPH Nodes: No enlarged glands  ENDOCRINE: No heat or cold intolerance; No hair loss  MUSCULOSKELETAL: No joint pain or swelling; No muscle, back, or extremity pain  PSYCHIATRIC: No depression, anxiety, mood swings, or difficulty sleeping  HEME/LYMPH: No easy bruising, or bleeding gums  ALLERGY AND IMMUNOLOGIC: No hives or eczema	    [ x] All others negative	  [ ] Unable to obtain    PHYSICAL EXAM:  T(C): 36.5 (12-27-24 @ 05:24), Max: 36.7 (12-26-24 @ 14:24)  HR: 62 (12-27-24 @ 05:24) (62 - 77)  BP: 129/85 (12-27-24 @ 05:24) (124/74 - 153/83)  RR: 18 (12-27-24 @ 05:24) (18 - 18)  SpO2: 93% (12-27-24 @ 05:24) (92% - 95%)  Wt(kg): --  I&O's Summary    26 Dec 2024 07:01  -  27 Dec 2024 07:00  --------------------------------------------------------  IN: 720 mL / OUT: 0 mL / NET: 720 mL    27 Dec 2024 07:01  -  27 Dec 2024 10:50  --------------------------------------------------------  IN: 240 mL / OUT: 0 mL / NET: 240 mL        Appearance: Normal	  HEENT:   Normal oral mucosa, PERRL, EOMI	  Lymphatic: No lymphadenopathy  Cardiovascular: Normal S1 S2, No JVD, + murmurs, No edema  Respiratory: Lungs clear to auscultation	  Psychiatry: A & O x 3, Mood & affect appropriate  Gastrointestinal:  Soft, Non-tender, + BS	  Skin: No rashes, No ecchymoses, No cyanosis	  Neurologic: Non-focal  Extremities: Normal range of motion, No clubbing, cyanosis or edema  Vascular: Peripheral pulses palpable 2+ bilaterally    MEDICATIONS  (STANDING):  albuterol/ipratropium for Nebulization 3 milliLiter(s) Nebulizer every 6 hours  albuterol/ipratropium for Nebulization.. 3 milliLiter(s) Nebulizer every 20 minutes  allopurinol 200 milliGRAM(s) Oral daily  amLODIPine   Tablet 5 milliGRAM(s) Oral at bedtime  aspirin enteric coated 81 milliGRAM(s) Oral daily  enoxaparin Injectable 40 milliGRAM(s) SubCutaneous every 12 hours  metoprolol succinate ER 25 milliGRAM(s) Oral daily  montelukast 10 milliGRAM(s) Oral at bedtime  sodium chloride 0.9%. 1000 milliLiter(s) (75 mL/Hr) IV Continuous <Continuous>  tiotropium 2.5 MICROgram(s)/olodaterol 2.5 MICROgram(s) (STIOLTO) Inhaler 2 Puff(s) Inhalation daily      TELEMETRY: 	    ECG:  	  RADIOLOGY:  OTHER: 	  	  LABS:	 	    CARDIAC MARKERS:                                15.0   8.54  )-----------( 297      ( 27 Dec 2024 07:24 )             45.7     12-27    141  |  103  |  18  ----------------------------<  81  3.1[L]   |  25  |  0.96    Ca    8.9      27 Dec 2024 07:22  Phos  3.2     12-27  Mg     2.2     12-27    TPro  6.1  /  Alb  3.7  /  TBili  0.4  /  DBili  <0.1  /  AST  101[H]  /  ALT  121[H]  /  AlkPhos  54  12-27    proBNP:   Lipid Profile: Cholesterol 162  LDL --  HDL 36    Cholesterol 167  LDL --  HDL 41  TG 75    HgA1c:   TSH:     < from: Cardiac Catheterization (12.26.24 @ 15:19) >  LM   Left main artery: Angiography shows minor irregularities.      LAD   Left anterior descending artery: Angiography shows mild  atherosclerosis.    CX   Circumflex: Angiography shows mild atherosclerosis.      RCA   Proximal right coronaryartery: There is a 40 % stenosis. Mid right  coronary artery: There is a 50 % stenosis.            Assessment and plan  ---------------------------  59M w/Hx of COPD, HTN, Gout presents due to SOB and generalized malaise. Patient reports he was in his normal state of health until this morning where he felt SOB walking at work. He denies cough, congestion, wheezing. Has not smoked in 15 years. Patient used his levalbuterol inhaler this morning, but did not help him. On my exam, he reports his condition has improved since arrival to ED. He is breathing comfortably on 2L NC. Patient denies fever, chills, headache, dizziness, abd pain, nausea, vomiting, constipation, dysuria. Denies sick contacts.   pt with known hx of copd and multiple risk factor for cad with c/o new onset of MURRAY and abnormal ecg  r/o coronary insufficiency  tele no arrythmia  asa  daily  cath today  add metoprolol  taper steroid  cardiac cath noted non obstructive  echo as out pt

## 2024-12-27 NOTE — CONSULT NOTE ADULT - SUBJECTIVE AND OBJECTIVE BOX
full consult to follow     1. Increased LFT   clinically asymptomatic  hepatitis panel negative  US reviewed w/Hepatic Steatosis, favor this is cause of his increase in LFT's  encourage weight loss and exercise   avoid unnecessary hepatotoxins   cont to trend     2. COPD      full consult to follow     1. Increased LFT   clinically asymptomatic w/negative hepatitis panel   US w/Hepatic Steatosis, favor this is cause of his increase in LFT's vs viral infxn (ebv)  encourage weight loss and exercise   avoid unnecessary hepatotoxins   cont to trend     2. COPD      full consult to follow     1. Increased LFT   clinically asymptomatic w/negative hepatitis panel   US w/Hepatic Steatosis, favor this is cause of his increase in LFT's   please check ebv serology/antibodies   encourage weight loss and exercise   avoid unnecessary hepatotoxins   cont to trend     2. COPD      full consult to follow     1. Increased LFT   clinically asymptomatic w/negative hepatitis panel   US w/Hepatic Steatosis, favor this is cause of his increase in LFT's   EBV serologies pending  encourage weight loss and exercise   avoid unnecessary hepatotoxins   cont to trend     2. COPD        Chief Complaint:  Patient is a 59y old  Male who presents with a chief complaint of COPD Exacerbation (27 Dec 2024 11:33)    COPD (chronic obstructive pulmonary disease)    Prostate cancer    H/O umbilical hernia repair    Mild persistent asthma    H/O prostatectomy    H/O umbilical hernia repair       HPI:  59M w/Hx of COPD, HTN, Gout presents due to SOB and generalized malaise. Patient reports he was in his normal state of health until this morning where he felt SOB walking at work. He denies cough, congestion, wheezing. Has not smoked in 15 years. Patient used his levalbuterol inhaler this morning, but did not help him. On my exam, he reports his condition has improved since arrival to ED. He is breathing comfortably on 2L NC. Patient denies fever, chills, headache, dizziness, abd pain, nausea, vomiting, constipation, dysuria. Denies sick contacts. (23 Dec 2024 22:10)  GI Consulted for increased LFT. The patient endorses no new supplements, etoh use, recreational drug use. He has no abdominal pain and never noticed any jaundice or icterus. He has no n/v/d/c. Appetite is good. Offers he does not follow a very healthy diet. Does not drink etoh very often at all.       No Known Drug Allergies  shellfish (Anaphylaxis; Rash)      acetaminophen     Tablet .. 650 milliGRAM(s) Oral every 6 hours PRN  albuterol/ipratropium for Nebulization 3 milliLiter(s) Nebulizer every 6 hours  albuterol/ipratropium for Nebulization.. 3 milliLiter(s) Nebulizer every 20 minutes  allopurinol 200 milliGRAM(s) Oral daily  amLODIPine   Tablet 5 milliGRAM(s) Oral at bedtime  aspirin enteric coated 81 milliGRAM(s) Oral daily  enoxaparin Injectable 40 milliGRAM(s) SubCutaneous every 12 hours  melatonin 3 milliGRAM(s) Oral at bedtime PRN  metoprolol succinate ER 25 milliGRAM(s) Oral daily  montelukast 10 milliGRAM(s) Oral at bedtime  sodium chloride 0.9%. 1000 milliLiter(s) IV Continuous <Continuous>  tiotropium 2.5 MICROgram(s)/olodaterol 2.5 MICROgram(s) (STIOLTO) Inhaler 2 Puff(s) Inhalation daily        FAMILY HISTORY:        Review of Systems:    General:  No wt loss, fevers, chills, night sweats, fatigue  Eyes:  Good vision, no reported pain  ENT:  No sore throat, pain, runny nose, dysphagia  CV:  No pain, palpitations, no lightheadedness  Resp:  No dyspnea, cough, tachypnea, wheezing  GI: denies n/v/d/c, abdominal pain, melena or brbpr   :  No pain, bleeding, incontinence, nocturia  Muscle:  No pain, weakness  Neuro:  No weakness, tingling, memory problems  Psych:  No fatigue, insomnia, mood problems, depression  Endocrine:  No polyuria, polydypsia, cold/heat intolerance  Heme:  No petechiae, ecchymosis, easy bruisability  Skin:  No rash, tattoos, scars, edema    Relevant Family History:   n/c    Relevant Social History: n/c      Physical Exam:    Vital Signs:  Vital Signs Last 24 Hrs  T(C): 36.5 (27 Dec 2024 05:24), Max: 36.7 (26 Dec 2024 14:24)  T(F): 97.7 (27 Dec 2024 05:24), Max: 98 (26 Dec 2024 14:24)  HR: 62 (27 Dec 2024 05:24) (62 - 77)  BP: 129/85 (27 Dec 2024 05:24) (124/74 - 153/83)  BP(mean): --  RR: 18 (27 Dec 2024 05:24) (18 - 18)  SpO2: 93% (27 Dec 2024 05:24) (92% - 95%)    Parameters below as of 27 Dec 2024 05:24  Patient On (Oxygen Delivery Method): room air      Daily     Daily     General:  Appears stated age, well-groomed, nad  HEENT:  NC/AT,  conjunctivae clear and pink, no thyromegaly, nodules, adenopathy, no JVD  Chest:  Full & symmetric excursion, no increased effort, breath sounds clear  Cardiovascular:  Regular rhythm, S1, S2, no murmur/rub/S3/S4, no abdominal bruit, no edema  Abdomen:  Soft, non-tender, non-distended, normoactive bowel sounds,  no masses ,no hepatosplenomeagaly, no signs of chronic liver disease  Extremities:  no cyanosis,clubbing or edema  Skin:  No rash/erythema/ecchymoses/petechiae/wounds/abscess/warm/dry  Neuro/Psych:  A&Ox3  , no asterixis, no tremor, no encephalopathy    Laboratory:                            15.0   8.54  )-----------( 297      ( 27 Dec 2024 07:24 )             45.7     12-27    141  |  103  |  18  ----------------------------<  81  3.1[L]   |  25  |  0.96    Ca    8.9      27 Dec 2024 07:22  Phos  3.2     12-27  Mg     2.2     12-27    TPro  6.1  /  Alb  3.7  /  TBili  0.4  /  DBili  <0.1  /  AST  101[H]  /  ALT  121[H]  /  AlkPhos  54  12-27    LIVER FUNCTIONS - ( 27 Dec 2024 07:22 )  Alb: 3.7 g/dL / Pro: 6.1 g/dL / ALK PHOS: 54 U/L / ALT: 121 U/L / AST: 101 U/L / GGT: x             Urinalysis Basic - ( 27 Dec 2024 07:22 )    Color: x / Appearance: x / SG: x / pH: x  Gluc: 81 mg/dL / Ketone: x  / Bili: x / Urobili: x   Blood: x / Protein: x / Nitrite: x   Leuk Esterase: x / RBC: x / WBC x   Sq Epi: x / Non Sq Epi: x / Bacteria: x        Imaging:      < from: US Abdomen Upper Quadrant Right (12.24.24 @ 09:30) >    ACC: [id number] EXAM:  US ABDOMEN RT UPR QUADRANT   ORDERED BY: LC SEAY     PROCEDURE DATE:  12/24/2024          INTERPRETATION:  CLINICAL INFORMATION: Elevated transaminases.    COMPARISON: CT abdomen and pelvis 5/8/2024.    TECHNIQUE: Sonography of the right upper quadrant.    FINDINGS:  Liver: Increased echogenicity, consistent with steatosis, with sparing   along the gallbladder fossa.  Bile ducts: Normal caliber. Common bile duct measures 4 mm.  Gallbladder: Within normal limits.  Pancreas: Visualized portions are within normal limits.  Right kidney: 12.9 cm. No hydronephrosis.  Ascites: None.  IVC: Visualized portions are within normal limits.    IMPRESSION:  *  Hepatic steatosis.  *  No evidence of biliary ductal dilatation.    --- End of Report ---          NIKA SUERO MD; Resident Radiologist  This document has been electronically signed.  NUBIA EASTMAN MD; Attending Radiologist  This document has been electronically signed. Dec 24 2024 10:10AM    < end of copied text >

## 2024-12-27 NOTE — CONSULT NOTE ADULT - ASSESSMENT
59M w/Hx of COPD, HTN, Gout presents due to SOB and generalized malaise.    1. Increased LFTs  clinically asymptomatic w/negative hepatitis panel; no etoh use  US w/Hepatic Steatosis, favor this is cause of his increase in LFT's, possible underlying viral infxn recently   EBV serologies pending  encourage weight loss and exercise - pt is perceptive to this   avoid unnecessary hepatotoxins   outpt f/u with his PCP to trend  no gi objection to d/c planning     2. COPD     3. HTN      The plan of care was discussed with the physician assistant and modifications were made to the notation where appropriate.   Differential diagnosis and plan of care discussed with patient after the evaluation  35 minutes spent on total encounter of which more than fifty percent of the encounter was spent counseling and/or coordinating care by the attending physician.    [facility]  Valdez Cano M.D.   [address] [place], [place]  Office: [phone]

## 2024-12-27 NOTE — PROGRESS NOTE ADULT - TIME BILLING
pt, cards , acp
pt , daughter who is a nurse , cardio and pul
pt, nurse , acp and called cardio   stable for dc if cleared by cards

## 2024-12-27 NOTE — PROGRESS NOTE ADULT - SUBJECTIVE AND OBJECTIVE BOX
Date of service: 12-27-24 @ 14:43      Patient is a 59y old  Male who presents with a chief complaint of COPD Exacerbation (27 Dec 2024 11:33)                                                               INTERVAL HPI/OVERNIGHT EVENTS:    REVIEW OF SYSTEMS:     CONSTITUTIONAL: No weakness, fevers or chills  EYES/ENT: No visual changes , no ear ache   NECK: No pain or stiffness  RESPIRATORY: No cough, wheezing,  No shortness of breath  CARDIOVASCULAR: No chest pain or palpitations  GASTROINTESTINAL: No abdominal pain  . No nausea, vomiting, or hematemesis; No diarrhea or constipation. No melena or hematochezia.  GENITOURINARY: No dysuria, frequency or hematuria  NEUROLOGICAL: No numbness or weakness  SKIN: No itching, burning, rashes, or lesions                                                                                                                                                                                                                                                                                 Medications:  MEDICATIONS  (STANDING):  albuterol/ipratropium for Nebulization 3 milliLiter(s) Nebulizer every 6 hours  albuterol/ipratropium for Nebulization.. 3 milliLiter(s) Nebulizer every 20 minutes  allopurinol 200 milliGRAM(s) Oral daily  amLODIPine   Tablet 5 milliGRAM(s) Oral at bedtime  aspirin enteric coated 81 milliGRAM(s) Oral daily  enoxaparin Injectable 40 milliGRAM(s) SubCutaneous every 12 hours  metoprolol succinate ER 25 milliGRAM(s) Oral daily  montelukast 10 milliGRAM(s) Oral at bedtime  sodium chloride 0.9%. 1000 milliLiter(s) (75 mL/Hr) IV Continuous <Continuous>  tiotropium 2.5 MICROgram(s)/olodaterol 2.5 MICROgram(s) (STIOLTO) Inhaler 2 Puff(s) Inhalation daily    MEDICATIONS  (PRN):  acetaminophen     Tablet .. 650 milliGRAM(s) Oral every 6 hours PRN Temp greater or equal to 38C (100.4F), Mild Pain (1 - 3)  melatonin 3 milliGRAM(s) Oral at bedtime PRN Insomnia       Allergies    No Known Drug Allergies  shellfish (Anaphylaxis; Rash)    Intolerances      Vital Signs Last 24 Hrs  T(C): 36.7 (27 Dec 2024 12:58), Max: 36.7 (26 Dec 2024 15:45)  T(F): 98 (27 Dec 2024 12:58), Max: 98 (26 Dec 2024 15:45)  HR: 68 (27 Dec 2024 12:58) (62 - 77)  BP: 124/56 (27 Dec 2024 12:58) (124/56 - 153/83)  BP(mean): --  RR: 18 (27 Dec 2024 12:58) (18 - 18)  SpO2: 95% (27 Dec 2024 12:58) (92% - 95%)    Parameters below as of 27 Dec 2024 12:58  Patient On (Oxygen Delivery Method): room air      CAPILLARY BLOOD GLUCOSE          12-26 @ 07:01 - 12-27 @ 07:00  --------------------------------------------------------  IN: 720 mL / OUT: 0 mL / NET: 720 mL    12-27 @ 07:01  -  12-27 @ 14:43  --------------------------------------------------------  IN: 440 mL / OUT: 0 mL / NET: 440 mL      Physical Exam:    Daily     Daily   General:  Well appearing, NAD, not cachetic  HEENT:  Nonicteric, PERRLA  CV:  RRR, S1S2   Lungs:  CTA B/L, no wheezes, rales, rhonchi  Abdomen:  Soft, non-tender, no distended, positive BS  Extremities:  2+ pulses, no c/c, no edema  Skin:  Warm and dry, no rashes  :  No mccord  Neuro:  AAOx3, non-focal, grossly intact                                                                                                                                                                                                                                                                                                LABS:                               15.0   8.54  )-----------( 297      ( 27 Dec 2024 07:24 )             45.7                      12-27    141  |  103  |  18  ----------------------------<  81  3.1[L]   |  25  |  0.96    Ca    8.9      27 Dec 2024 07:22  Phos  3.2     12-27  Mg     2.2     12-27    TPro  6.1  /  Alb  3.7  /  TBili  0.4  /  DBili  <0.1  /  AST  101[H]  /  ALT  121[H]  /  AlkPhos  54  12-27                       RADIOLOGY & ADDITIONAL TESTS         I personally reviewed: [  ]EKG   [  ]CXR    [  ] CT      A/P:         Discussed with :     Maril[facility] consultants' Notes   Time spent :

## 2024-12-27 NOTE — PROGRESS NOTE ADULT - PARTICIPANTS
reviewed MOLST with pt :  he would like to continue discussion with his pmd and family as op/Patient

## 2024-12-27 NOTE — PROGRESS NOTE ADULT - ASSESSMENT
59M w/Hx of COPD, HTN, Gout presents due to SOB and generalized malaise.         hypoxia / dyspnea : possibly sec to acute  COPD exacerbation.   doubt PE given much improvement and NL D-dimer   complete short course of steroids as op   initially hypoxic no w reoslved   called pul and discussed : fu attending input     abn EKG : non specific changes   trop neg   Reported exertional shortness of breath cardiology.. Plan for catheterization        · Elevated transaminase level.   : mild and imroved   US no acute pathology         ·  Problem: HTN (hypertension).   ·  Plan: - Continue amlodipine.  Consider changing to beta-blocker    ·  Problem: Gout.   ·  Plan: - Continue allopurinol.
59M w/Hx of COPD, HTN, Gout presents due to SOB and generalized malaise.         hypoxia / dyspnea : possibly sec to mild acute  COPD exacerbation : improved   doubt PE given much improvement and NL D-dimer   complete short course of steroids as op   initially hypoxic no w reoslved   cath neg     abn EKG : non specific changes   trop neg   Reported exertional shortness of breath cardiology.. Plan for catheterization        · Elevated transaminase level.   fluctuating   US no acute pathology :   steatosis   can fu with g as op  hold off on liptor for now         ·  Problem: HTN (hypertension).   ·  Plan: - Continue amlodipine.  Consider changing to beta-blocker    ·  Problem: Gout.   ·  Plan: - Continue allopurinol.
59M w/Hx of COPD, HTN, Gout presents due to SOB and generalized malaise.         hypoxia / dyspnea : possibly sec to acute  COPD exacerbation.   doubt PE given much improvement and NL D-dimer   complete short course of steroids as op   initially hypoxic no w reoslved       abn EKG : non specific changes   trop neg   Reported exertional shortness of breath cardiology.. Plan for catheterization        · Elevated transaminase level.   : mild and imroved   US no acute pathology         ·  Problem: HTN (hypertension).   ·  Plan: - Continue amlodipine.  Consider changing to beta-blocker    ·  Problem: Gout.   ·  Plan: - Continue allopurinol.
59M w/Hx of COPD, HTN, Gout presents due to SOB and generalized malaise.         hypoxia / dysppnea : possibly sec to acute  COPD exacerbation.   doubt PE given much improvement and NL D-dimer   complete short course of steroids as op   initially hypoxic no w reoslved       abn EKG : non specific chnages   trop neg   no exertional cp/sob         · Elevated transaminase level.   : mild and iproved   US no acute pathology         ·  Problem: HTN (hypertension).   ·  Plan: - Continue amlodipine.      ·  Problem: Gout.   ·  Plan: - Continue allopurinol.
60 y/o M with PMH of COPD, HTN, Gout. Presents with SOB and generalized malaise. Patient reports he was in his normal state of health until this morning where he felt SOB walking at work. He denies cough, congestion, wheezing. Has not smoked in 15 years. Patient used his levalbuterol inhaler this morning, but did not help him.

## 2024-12-27 NOTE — PROGRESS NOTE ADULT - REASON FOR ADMISSION
COPD Exacerbation

## 2024-12-27 NOTE — PROGRESS NOTE ADULT - PROVIDER SPECIALTY LIST ADULT
Cardiology
Internal Medicine
Pulmonology

## 2024-12-27 NOTE — PROGRESS NOTE ADULT - SUBJECTIVE AND OBJECTIVE BOX
Follow-up Pulm Progress Note    No new respiratory events overnight.  Denies SOB/CP.     Medications:  MEDICATIONS  (STANDING):  albuterol/ipratropium for Nebulization 3 milliLiter(s) Nebulizer every 6 hours  albuterol/ipratropium for Nebulization.. 3 milliLiter(s) Nebulizer every 20 minutes  allopurinol 200 milliGRAM(s) Oral daily  amLODIPine   Tablet 5 milliGRAM(s) Oral at bedtime  aspirin enteric coated 81 milliGRAM(s) Oral daily  enoxaparin Injectable 40 milliGRAM(s) SubCutaneous every 12 hours  metoprolol succinate ER 25 milliGRAM(s) Oral daily  montelukast 10 milliGRAM(s) Oral at bedtime  sodium chloride 0.9%. 1000 milliLiter(s) (75 mL/Hr) IV Continuous <Continuous>  tiotropium 2.5 MICROgram(s)/olodaterol 2.5 MICROgram(s) (STIOLTO) Inhaler 2 Puff(s) Inhalation daily    MEDICATIONS  (PRN):  acetaminophen     Tablet .. 650 milliGRAM(s) Oral every 6 hours PRN Temp greater or equal to 38C (100.4F), Mild Pain (1 - 3)  melatonin 3 milliGRAM(s) Oral at bedtime PRN Insomnia          Vital Signs Last 24 Hrs  T(C): 36.5 (27 Dec 2024 05:24), Max: 36.7 (26 Dec 2024 14:24)  T(F): 97.7 (27 Dec 2024 05:24), Max: 98 (26 Dec 2024 14:24)  HR: 62 (27 Dec 2024 05:24) (62 - 77)  BP: 129/85 (27 Dec 2024 05:24) (124/74 - 153/83)  BP(mean): --  RR: 18 (27 Dec 2024 05:24) (18 - 18)  SpO2: 93% (27 Dec 2024 05:24) (92% - 95%)    Parameters below as of 27 Dec 2024 05:24  Patient On (Oxygen Delivery Method): room air              12-26 @ 07:01  -  12-27 @ 07:00  --------------------------------------------------------  IN: 720 mL / OUT: 0 mL / NET: 720 mL          LABS:                        15.0   8.54  )-----------( 297      ( 27 Dec 2024 07:24 )             45.7     12-27    141  |  103  |  18  ----------------------------<  81  3.1[L]   |  25  |  0.96    Ca    8.9      27 Dec 2024 07:22  Phos  3.2     12-27  Mg     2.2     12-27    TPro  6.1  /  Alb  3.7  /  TBili  0.4  /  DBili  <0.1  /  AST  101[H]  /  ALT  121[H]  /  AlkPhos  54  12-27          CAPILLARY BLOOD GLUCOSE          Urinalysis Basic - ( 27 Dec 2024 07:22 )    Color: x / Appearance: x / SG: x / pH: x  Gluc: 81 mg/dL / Ketone: x  / Bili: x / Urobili: x   Blood: x / Protein: x / Nitrite: x   Leuk Esterase: x / RBC: x / WBC x   Sq Epi: x / Non Sq Epi: x / Bacteria: x                Physical Examination:  PULM: Clear to auscultation bilaterally, no significant sputum production  CVS: S1, S2 heard    RADIOLOGY REVIEWED  CXR: clear lungs

## 2024-12-27 NOTE — PROGRESS NOTE ADULT - PROBLEM SELECTOR PLAN 2
by hx  -No wheezing on exam at this time  -Continue Stiolto 2 puffs qd (home med: Anoro Ellipta)  -Continue Duoneb q6h for now.

## 2024-12-28 LAB
EBV EA AB SER IA-ACNC: <5 U/ML — SIGNIFICANT CHANGE UP
EBV EA AB TITR SER IF: NEGATIVE — SIGNIFICANT CHANGE UP
EBV EA IGG SER-ACNC: NEGATIVE — SIGNIFICANT CHANGE UP
EBV NA IGG SER IA-ACNC: 6.06 U/ML — SIGNIFICANT CHANGE UP
EBV PATRN SPEC IB-IMP: SIGNIFICANT CHANGE UP
EBV VCA IGG AVIDITY SER QL IA: POSITIVE
EBV VCA IGM SER IA-ACNC: 31.9 U/ML — SIGNIFICANT CHANGE UP
EBV VCA IGM SER IA-ACNC: >750 U/ML — HIGH
EBV VCA IGM TITR FLD: NEGATIVE — SIGNIFICANT CHANGE UP

## 2025-01-15 RX ORDER — MONTELUKAST 10 MG/1
10 TABLET, FILM COATED ORAL
Qty: 90 | Refills: 3 | Status: ACTIVE | COMMUNITY
Start: 2025-01-15 | End: 1900-01-01

## 2025-01-16 ENCOUNTER — APPOINTMENT (OUTPATIENT)
Dept: PULMONOLOGY | Facility: CLINIC | Age: 60
End: 2025-01-16
Payer: COMMERCIAL

## 2025-01-16 ENCOUNTER — NON-APPOINTMENT (OUTPATIENT)
Age: 60
End: 2025-01-16

## 2025-01-16 VITALS
HEART RATE: 98 BPM | WEIGHT: 224 LBS | SYSTOLIC BLOOD PRESSURE: 114 MMHG | OXYGEN SATURATION: 96 % | DIASTOLIC BLOOD PRESSURE: 88 MMHG | BODY MASS INDEX: 34.06 KG/M2 | TEMPERATURE: 98.3 F

## 2025-01-16 PROCEDURE — 99214 OFFICE O/P EST MOD 30 MIN: CPT

## 2025-04-10 RX ORDER — UMECLIDINIUM BROMIDE AND VILANTEROL TRIFENATATE 62.5; 25 UG/1; UG/1
62.5-25 POWDER RESPIRATORY (INHALATION)
Qty: 1 | Refills: 3 | Status: ACTIVE | COMMUNITY
Start: 2025-04-10 | End: 1900-01-01

## 2025-04-10 RX ORDER — LEVALBUTEROL TARTRATE 45 UG/1
45 AEROSOL, METERED ORAL TWICE DAILY
Qty: 1 | Refills: 3 | Status: ACTIVE | COMMUNITY
Start: 2025-04-10 | End: 1900-01-01

## 2025-04-15 RX ORDER — LEVALBUTEROL TARTRATE 45 UG/1
45 AEROSOL, METERED ORAL TWICE DAILY
Qty: 3 | Refills: 3 | Status: ACTIVE | COMMUNITY
Start: 2025-04-15 | End: 1900-01-01

## 2025-04-15 RX ORDER — UMECLIDINIUM BROMIDE AND VILANTEROL TRIFENATATE 62.5; 25 UG/1; UG/1
62.5-25 POWDER RESPIRATORY (INHALATION)
Qty: 3 | Refills: 3 | Status: ACTIVE | COMMUNITY
Start: 2025-04-15

## 2025-05-20 ENCOUNTER — APPOINTMENT (OUTPATIENT)
Dept: PULMONOLOGY | Facility: CLINIC | Age: 60
End: 2025-05-20

## 2025-05-20 VITALS
BODY MASS INDEX: 34.97 KG/M2 | SYSTOLIC BLOOD PRESSURE: 126 MMHG | OXYGEN SATURATION: 96 % | TEMPERATURE: 98 F | HEART RATE: 93 BPM | DIASTOLIC BLOOD PRESSURE: 76 MMHG | WEIGHT: 230 LBS

## 2025-05-20 PROCEDURE — 99214 OFFICE O/P EST MOD 30 MIN: CPT

## 2025-06-18 RX ORDER — UMECLIDINIUM BROMIDE AND VILANTEROL TRIFENATATE 62.5; 25 UG/1; UG/1
62.5-25 POWDER RESPIRATORY (INHALATION)
Qty: 3 | Refills: 3 | Status: ACTIVE | COMMUNITY
Start: 2025-06-18 | End: 1900-01-01

## 2025-06-18 RX ORDER — LEVALBUTEROL TARTRATE 45 UG/1
45 AEROSOL, METERED ORAL TWICE DAILY
Qty: 3 | Refills: 3 | Status: ACTIVE | COMMUNITY
Start: 2025-06-18 | End: 1900-01-01

## (undated) DEVICE — SPECIMEN CONTAINER 100ML

## (undated) DEVICE — SOL IRR POUR NS 0.9% 500ML

## (undated) DEVICE — SUT SURGIPRO 1 30" GS-21

## (undated) DEVICE — VENODYNE/SCD SLEEVE CALF MEDIUM

## (undated) DEVICE — WARMING BLANKET UPPER ADULT

## (undated) DEVICE — GLV 7.5 PROTEXIS (WHITE)

## (undated) DEVICE — DRSG TEGADERM + PAD 3.5X4"

## (undated) DEVICE — PACK ADULT HERNIA

## (undated) DEVICE — SUT MONOCRYL 4-0 27" PS-2 UNDYED

## (undated) DEVICE — SOL IRR POUR H2O 250ML

## (undated) DEVICE — PREP CHLORAPREP HI-LITE ORANGE 26ML

## (undated) DEVICE — DRAPE IOBAN 33" X 23"

## (undated) DEVICE — DRSG STERISTRIPS 0.5 X 4"

## (undated) DEVICE — SUT MAXON 0 30" GS-22

## (undated) DEVICE — DRAPE INSTRUMENT POUCH 6.75" X 11"

## (undated) DEVICE — DRAPE TOWEL BLUE 17" X 24"

## (undated) DEVICE — MEDICATION LABELS W MARKER

## (undated) DEVICE — SUT POLYSORB 3-0 30" V-20 UNDYED

## (undated) DEVICE — BLADE SCALPEL SAFETYLOCK #15

## (undated) DEVICE — DRSG MASTISOL